# Patient Record
Sex: FEMALE | Race: AMERICAN INDIAN OR ALASKA NATIVE
[De-identification: names, ages, dates, MRNs, and addresses within clinical notes are randomized per-mention and may not be internally consistent; named-entity substitution may affect disease eponyms.]

---

## 2018-02-10 NOTE — EMERGENCY DEPARTMENT REPORT
ED General Adult HPI





- General


Chief complaint: Chest Pain


Stated complaint: ASTHMA/CP


Time Seen by Provider: 02/10/18 15:16


Source: patient


Mode of arrival: Ambulatory


Limitations: No Limitations





- History of Present Illness


Initial comments: 





Patient from out of town this visiting, states forgot her inhalers she states 

she is having chest pain with shortness of breath with question of leg pain 

with question of sinus infection with cough, "sinus infection is going into my 

lungs"


-: Gradual, days(s)


Severity scale (0 -10): 0


Associated Symptoms: chest pain, cough, fever/chills, malaise, shortness of 

breath.  denies: confusion, diaphoresis, headaches, rash, seizure, syncope, 

weakness





- Related Data


 Allergies











Allergy/AdvReac Type Severity Reaction Status Date / Time


 


aspirin Allergy  Hives Verified 02/10/18 12:05


 


codeine Allergy  Hives Verified 02/10/18 12:05


 


Penicillins Allergy  Hives Verified 02/10/18 12:05














ED Review of Systems


ROS: 


Stated complaint: ASTHMA/CP


Other details as noted in HPI





Comment: All other systems reviewed and negative


Constitutional: malaise


ENT: denies: dental pain


Respiratory: cough, shortness of breath, wheezing.  denies: stridor


Cardiovascular: chest pain, other (no exertional chest pain, no tearing pain)


Neurological: denies: headache, weakness, numbness, paresthesias, abnormal gait

, vertigo





ED Past Medical Hx





- Past Medical History


Previous Medical History?: Yes


Hx Hypertension: Yes


Hx Heart Attack/AMI: Yes


Hx Diabetes: Yes (type 2)


Hx Asthma: Yes


Additional medical history: stent placements





- Surgical History


Past Surgical History?: Yes


Additional Surgical History: back, knees





- Social History


Smoking Status: Never Smoker


Substance Use Type: None





ED Physical Exam





- General


Limitations: No Limitations


General appearance: alert, anxious





- Head


Head exam: Present: atraumatic, normocephalic





- Eye


Eye exam: Present: normal appearance, PERRL, EOMI





- ENT


ENT exam: Present: normal exam, normal orophraynx, mucous membranes moist





- Neck


Neck exam: Present: normal inspection.  Absent: tenderness, meningismus





- Respiratory


Respiratory exam: Present: wheezes, rhonchi.  Absent: stridor, chest wall 

tenderness, accessory muscle use





- Cardiovascular


Cardiovascular Exam: Present: regular rate, normal rhythm, normal heart sounds, 

other (pulses equal bilaterally)





- GI/Abdominal


GI/Abdominal exam: Present: soft.  Absent: distended, tenderness, guarding, 

rebound, rigid, mass, pulsatile mass





- Extremities Exam


Extremities exam: Present: normal inspection, normal capillary refill.  Absent: 

tenderness, pedal edema, joint swelling, calf tenderness





- Neurological Exam


Neurological exam: Present: alert, oriented X3, CN II-XII intact.  Absent: 

motor sensory deficit





ED Course


 Vital Signs











  02/10/18 02/10/18 02/10/18





  12:06 14:59 15:50


 


Temperature 98 F  


 


Pulse Rate 62 88 


 


Pulse Rate [   86





Anterior   





Bilateral   





Throughout]   


 


Respiratory 16 20 





Rate   


 


Respiratory   18





Rate [Anterior   





Bilateral   





Throughout]   


 


Blood Pressure  157/89 





[Left]   


 


O2 Sat by Pulse 99 95 





Oximetry   














  02/10/18 02/10/18 02/10/18





  16:18 16:37 17:45


 


Temperature   


 


Pulse Rate   99 H


 


Pulse Rate [ 95 H  





Anterior   





Bilateral   





Throughout]   


 


Respiratory  20 20





Rate   


 


Respiratory 16  





Rate [Anterior   





Bilateral   





Throughout]   


 


Blood Pressure   139/84





[Left]   


 


O2 Sat by Pulse  100 99





Oximetry   














- Reevaluation(s)


Reevaluation #1: 





02/10/18 19:27


Given the nature of the symptoms with shortness of breath and chest pain we did 

elect to get a VQ scan this was read as low prob.  Chest x-ray was read as no 

acute process per the radiologist





ED Medical Decision Making





- Lab Data


Result diagrams: 


 02/10/18 12:26





 02/10/18 12:26





- EKG Data


EKG shows normal: sinus rhythm





- EKG Data





02/10/18 19:28


no ischemic change normal sinus rhythm





- Radiology Data


Radiology results: report reviewed





- Medical Decision Making





Laboratory studies did show abnormal rf therefore v/q was obtained .negative 

troponin and symptoms are not consistent with ACS.  Patient doeshave symptoms 

consistent with acute exacerbation of chronic lung disease with acute 

bronchitis.w/ hx of asbestos exposure, did not get flu shot,  She had low 

problem PE study she is not improved in the ED with improved vs but still 

dypsneic in ed and uncomfortable at rest w/ desats w/ activity, .she was 

therefore admitted for copd/ restrictive lung dz exacerbation, , after 

consultation w/ dr dominguez who will eval pt in ed.


Critical care attestation.: 


If time is entered above; I have spent that time in minutes in the direct care 

of this critically ill patient, excluding procedure time.








ED Disposition


Clinical Impression: 


 Decompensated chronic obstructive pulmonary disease with exacerbation





Disposition: DC-09 OP ADMIT IP TO THIS HOSP


Is pt being admited?: Yes


Condition: Stable


Instructions:  Chronic Obstructive Pulmonary Disease (ED)


Time of Disposition: 19:40

## 2018-02-10 NOTE — HISTORY AND PHYSICAL REPORT
History of Present Illness


Date of examination: 02/10/18


Date of admission: 





2/10/18


Chief complaint: 





Increasing SOB/wheezing for 2 days


History of present illness: 


History of Present Illness


66 y/o AAF with Pmh of T2DM CAD HTn and asthma presents with increasing SOB and 

wheezing.Cough productive of mucoid sputum


No fever or chillsNo recent travel





Past Medical History


Previous Medical History?: Yes


Hx Hypertension: Yes


Hx Heart Attack/AMI: Yes


Hx Diabetes: Yes (type 2)


Hx Asthma: Yes


Additional medical history: stent placements





- Surgical History


Past Surgical History?: Yes


Additional Surgical History: back, knees





- Social History


Smoking Status: Never Smoker


Substance Use Type: None








 Review of Systems


ROS: 


Stated complaint: ASTHMA/CP


Other details as noted in HPI





Comment: All other systems reviewed and negative


Constitutional: malaise


ENT: denies: dental pain


Respiratory: cough, shortness of breath, wheezing.  denies: stridor


Cardiovascular: chest pain, other (no exertional chest pain, no tearing pain)


Neurological: denies: headache, weakness, numbness, paresthesias, abnormal gait

, vertigo

















Medications and Allergies


 Allergies











Allergy/AdvReac Type Severity Reaction Status Date / Time


 


aspirin Allergy  Hives Verified 02/10/18 12:05


 


codeine Allergy  Hives Verified 02/10/18 12:05


 


Penicillins Allergy  Hives Verified 02/10/18 12:05











 Home Medications











 Medication  Instructions  Recorded  Confirmed  Last Taken  Type


 


ALPRAZolam [Xanax] 25 mg PO DAILY 02/10/18 02/10/18 Unknown History


 


Amlodipine Besylate [Norvasc] 5 mg PO DAILY 02/10/18 02/10/18 Unknown History


 


Canagliflozin [Invokana] 300 mg PO DAILY 02/10/18 02/10/18 Unknown History


 


Clopidogrel Bisulfate [Plavix] 75 mg PO DAILY 02/10/18 02/10/18 Unknown History


 


Cyclobenzaprine [Flexeril 10 MG 10 mg PO DAILY PRN 02/10/18 02/10/18 Unknown 

History





TAB]     


 


Diclofenac Sodium 75 mg PO DAILY PRN 02/10/18 02/10/18 Unknown History


 


Donepezil [Aricept] 5 mg PO DAILY 02/10/18 02/10/18 Unknown History


 


Linaclotide [Linzess] 145 mcg PO QDAY 02/10/18 02/10/18 Unknown History


 


Metoprolol Jay/Hydrochlorothiaz 100 mg PO DAILY 02/10/18 02/10/18 Unknown History





[Metoprolol ER-Hctz 100-12.5 mg]     


 


Montelukast [Singulair] 10 mg PO QPM 02/10/18 02/10/18 Unknown History


 


Ondansetron [Zofran Odt] 4 mg PO DAILY PRN 02/10/18 02/10/18 Unknown History


 


Pantoprazole [Protonix] 40 mg PO QDAY 02/10/18 02/10/18 Unknown History


 


Ranolazine [Ranexa] 1,000 mg PO BID 02/10/18 02/10/18 Unknown History


 


Rosuvastatin Calcium [Crestor] 40 mg PO DAILY 02/10/18 02/10/18 Unknown History


 


Sertraline [Zoloft] 100 mg PO HS 02/10/18 02/10/18 Unknown History


 


Spironolactone 25 mg PO DAILY 02/10/18 02/10/18 Unknown History


 


Sucralfate [Carafate] 1 gm PO ACHS 02/10/18 02/10/18 Unknown History


 


metFORMIN [Glucophage] 500 mg PO QDAY 02/10/18 02/10/18 Unknown History














Exam





- Constitutional


Vitals: 


 











Temp Pulse Resp BP Pulse Ox


 


 98.2 F   94 H  18   137/85   100 


 


 02/10/18 19:15  02/10/18 20:35  02/10/18 20:35  02/10/18 19:15  02/10/18 19:15














Results





- Labs


CBC & Chem 7: 


 02/11/18 03:13





 02/11/18 03:13


Labs: 


 Laboratory Last Values











WBC  5.0 K/mm3 (4.5-11.0)   02/10/18  12:26    


 


RBC  4.45 M/mm3 (3.65-5.03)   02/10/18  12:26    


 


Hgb  13.3 gm/dl (10.1-14.3)   02/10/18  12:26    


 


Hct  40.2 % (30.3-42.9)   02/10/18  12:26    


 


MCV  90 fl (79-97)   02/10/18  12:26    


 


MCH  30 pg (28-32)   02/10/18  12:26    


 


MCHC  33 % (30-34)   02/10/18  12:26    


 


RDW  14.1 % (13.2-15.2)   02/10/18  12:26    


 


Plt Count  150 K/mm3 (140-440)   02/10/18  12:26    


 


Lymph % (Auto)  19.0 % (13.4-35.0)   02/10/18  12:26    


 


Mono % (Auto)  9.5 % (0.0-7.3)  H  02/10/18  12:26    


 


Eos % (Auto)  0.4 % (0.0-4.3)   02/10/18  12:26    


 


Baso % (Auto)  0.3 % (0.0-1.8)   02/10/18  12:26    


 


Lymph #  0.9 K/mm3 (1.2-5.4)  L  02/10/18  12:26    


 


Mono #  0.5 K/mm3 (0.0-0.8)   02/10/18  12:26    


 


Eos #  0.0 K/mm3 (0.0-0.4)   02/10/18  12:26    


 


Baso #  0.0 K/mm3 (0.0-0.1)   02/10/18  12:26    


 


Seg Neutrophils %  70.8 % (40.0-70.0)  H  02/10/18  12:26    


 


Seg Neutrophils #  3.5 K/mm3 (1.8-7.7)   02/10/18  12:26    


 


D-Dimer  354.19 ng/mlDDU (0-234)  H  02/10/18  15:46    


 


Sodium  136 mmol/L (137-145)  L  02/10/18  12:26    


 


Potassium  4.0 mmol/L (3.6-5.0)   02/10/18  12:26    


 


Chloride  100.4 mmol/L ()   02/10/18  12:26    


 


Carbon Dioxide  19 mmol/L (22-30)  L  02/10/18  12:26    


 


Anion Gap  21 mmol/L  02/10/18  12:26    


 


BUN  25 mg/dL (7-17)  H  02/10/18  12:26    


 


Creatinine  1.6 mg/dL (0.7-1.2)  H  02/10/18  12:26    


 


Estimated GFR  32 ml/min  02/10/18  12:26    


 


BUN/Creatinine Ratio  16 %  02/10/18  12:26    


 


Glucose  108 mg/dL ()  H  02/10/18  12:26    


 


Calcium  9.2 mg/dL (8.4-10.2)   02/10/18  12:26    


 


Troponin T  < 0.010 ng/mL (0.00-0.029)   02/10/18  17:29    














- Imaging and Cardiology


EKG: report reviewed


Chest x-ray: report reviewed





Assessment and Plan


Advance Directives: Yes (Full code)


VTE prophylaxis?: Chemical


Plan of care discussed with patient/family: Yes





- Patient Problems


(1) Acute respiratory failure with hypoxia


Current Visit: Yes   Status: Acute   


Plan to address problem: 


Initial sats were 89 percent 


Improved








(2) Asthma with exacerbation


Current Visit: Yes   Status: Acute   


Qualifiers: 


   Asthma severity: severe 


Plan to address problem: 


COnt Bronchodilators Solumedrol IV and IV abx








(3) HTN (hypertension)


Current Visit: Yes   Status: Chronic   


Qualifiers: 


   Hypertension type: essential hypertension   Qualified Code(s): I10 - 

Essential (primary) hypertension   


Plan to address problem: 


Cont Antihypertensives








(4) T2DM (type 2 diabetes mellitus)


Current Visit: Yes   Status: Chronic   


Qualifiers: 


   Diabetes mellitus complication status: without complication 


Plan to address problem: 


Cont coverage and oral Hypoglycemics


Check A1c








(5) HLD (hyperlipidemia)


Current Visit: Yes   Status: Chronic   


Qualifiers: 


   Hyperlipidemia type: mixed hyperlipidemia   Qualified Code(s): E78.2 - Mixed 

hyperlipidemia   


Plan to address problem: 


Cont statins








(6) DVT prophylaxis


Current Visit: Yes   Status: Acute   


Plan to address problem: 


on Lovenox

## 2018-02-10 NOTE — NUCLEAR MEDICINE REPORT
FINAL REPORT



EXAM:  NM LUNG SCAN PERF/VENT



HISTORY:  elev ddimer/low gfr 



COMPARISON:  Chest x-ray from the same date. 



TECHNIQUE:  5 millicuries technetium 99 MAA and 15 millicurie

xenon 133 given for the perfusion and ventilation portions of the

exam. 



FINDINGS:  

Relatively homogeneous uptake of tracer in both the ventilation

and perfusion portions of the exam. No mismatched defect. 



IMPRESSION:  

Very low probability exam for pulmonary embolus.

## 2018-02-10 NOTE — XRAY REPORT
CHEST TWO VIEWS: 02/10/18 11:48:00



CLINICAL: Chest pain.



COMPARISON: None



FINDINGS: Normal heart and pulmonary vasculature.Large calcified 

bilateral hilar lymph nodes and large calcified mediastinal lymph 

nodes.  Small bilateral upper lobe calcified granulomata.  No airspace 

disease or pleural effusion.The bones and soft tissues are unremarkable.



IMPRESSION: No acute cardiopulmonary process.  Old granulomatous 

disease with calcified mediastinal and bilateral hilar lymphadenopathy.

## 2018-02-11 NOTE — PROGRESS NOTE
Assessment and Plan





/Acute respiratory failure with hypoxia


Initial sats were 89 percent 


Improved with current mx








/ Asthma with exacerbation


Cont Bronchodilators Solumedrol IV and IV abx








/HTN (hypertension)


Cont Antihypertensives








/T2DM (type 2 diabetes mellitus)


Cont coverage with SSI 


Hold oral Hypoglycemics metformin for declining renal function, con be restart 

when renal function normalizes


A1c 5.5





/KATY, vasomotor nephropathy


- cont mild hydration





/HLD (hyperlipidemia)


Cont statins








/DVT prophylaxis


on Lovenox, renaly dosed











Subjective


Date of service: 02/11/18


Interval history: 





Pt seen and examined


c/o SOB with exertion, tolerating diet











Objective





- Constitutional


Vitals: 


 Vital Signs - 12hr











  02/10/18 02/10/18 02/10/18





  23:00 23:12 23:20


 


Temperature   


 


Pulse Rate 97 H 95 H 96 H


 


Pulse Rate [   





Anterior   





Bilateral   





Throughout]   


 


Respiratory 19 19 20





Rate   


 


Respiratory   





Rate [Anterior   





Bilateral   





Throughout]   


 


Blood Pressure 140/79 140/79 140/79


 


Blood Pressure   





[Left]   


 


O2 Sat by Pulse 99 100 100





Oximetry   














  02/10/18 02/10/18 02/11/18





  23:30 23:40 00:07


 


Temperature   99.9 F H


 


Pulse Rate 95 H 94 H 


 


Pulse Rate [   





Anterior   





Bilateral   





Throughout]   


 


Respiratory 21 19 18





Rate   


 


Respiratory   





Rate [Anterior   





Bilateral   





Throughout]   


 


Blood Pressure 144/82 140/79 147/93


 


Blood Pressure   





[Left]   


 


O2 Sat by Pulse 100 100 





Oximetry   














  02/11/18 02/11/18 02/11/18





  00:16 02:27 02:41


 


Temperature   


 


Pulse Rate   


 


Pulse Rate [  89 





Anterior   





Bilateral   





Throughout]   


 


Respiratory 19  





Rate   


 


Respiratory  18 18





Rate [Anterior   





Bilateral   





Throughout]   


 


Blood Pressure   


 


Blood Pressure   





[Left]   


 


O2 Sat by Pulse 96  





Oximetry   














  02/11/18 02/11/18 02/11/18





  05:39 08:00 10:03


 


Temperature 99.2 F 98.8 F 


 


Pulse Rate 97 H 91 H 


 


Pulse Rate [   97 H





Anterior   





Bilateral   





Throughout]   


 


Respiratory 18 18 





Rate   


 


Respiratory   20





Rate [Anterior   





Bilateral   





Throughout]   


 


Blood Pressure 145/73  


 


Blood Pressure  158/92 





[Left]   


 


O2 Sat by Pulse 98  





Oximetry   














  02/11/18 02/11/18 02/11/18





  10:14 10:19 10:20


 


Temperature   


 


Pulse Rate  91 H 91 H


 


Pulse Rate [ 90  





Anterior   





Bilateral   





Throughout]   


 


Respiratory   





Rate   


 


Respiratory 20  





Rate [Anterior   





Bilateral   





Throughout]   


 


Blood Pressure  158/92 158/92


 


Blood Pressure   





[Left]   


 


O2 Sat by Pulse   





Oximetry   











General appearance: Present: no acute distress, well-nourished





- EENT


Eyes: PERRL, EOM intact


ENT: hearing intact, clear oral mucosa


Ears: bilateral: normal





- Neck


Neck: supple, normal ROM





- Respiratory


Respiratory effort: normal


Respiratory: bilateral: wheezing





- Cardiovascular


Rhythm: regular


Heart Sounds: Present: S1 & S2.  Absent: gallop, rub


Extremities: pulses intact, No edema, normal color, Full ROM





- Gastrointestinal


General gastrointestinal: Present: soft, non-tender, non-distended, normal 

bowel sounds





- Integumentary


Integumentary: clear, warm, dry





- Musculoskeletal


Musculoskeletal: 1, strength equal bilaterally





- Neurologic


Neurologic: moves all extremities





- Psychiatric


Psychiatric: memory intact, appropriate mood/affect, intact judgment & insight





- Labs


CBC & Chem 7: 


 02/11/18 03:13





 02/11/18 03:13


Labs: 


 Abnormal lab results











  02/10/18 02/10/18 02/10/18 Range/Units





  12:26 12:26 15:46 


 


Plt Count     (140-440)  K/mm3


 


Mono % (Auto)  9.5 H    (0.0-7.3)  %


 


Lymph #  0.9 L    (1.2-5.4)  K/mm3


 


Seg Neutrophils %  70.8 H    (40.0-70.0)  %


 


Seg Neuts % (Manual)     (40.0-70.0)  %


 


Lymphocytes % (Manual)     (13.4-35.0)  %


 


Lymphocytes # (Manual)     (1.2-5.4)  K/mm3


 


D-Dimer    354.19 H  (0-234)  ng/mlDDU


 


Sodium   136 L   (137-145)  mmol/L


 


Chloride     ()  mmol/L


 


Carbon Dioxide   19 L   (22-30)  mmol/L


 


BUN   25 H   (7-17)  mg/dL


 


Creatinine   1.6 H   (0.7-1.2)  mg/dL


 


Glucose   108 H   ()  mg/dL


 


POC Glucose     ()  


 


Alkaline Phosphatase     ()  units/L














  02/11/18 02/11/18 02/11/18 Range/Units





  03:13 03:13 08:15 


 


Plt Count  135 L    (140-440)  K/mm3


 


Mono % (Auto)     (0.0-7.3)  %


 


Lymph #     (1.2-5.4)  K/mm3


 


Seg Neutrophils %     (40.0-70.0)  %


 


Seg Neuts % (Manual)  32.0 L    (40.0-70.0)  %


 


Lymphocytes % (Manual)  7.0 L    (13.4-35.0)  %


 


Lymphocytes # (Manual)  0.5 L    (1.2-5.4)  K/mm3


 


D-Dimer     (0-234)  ng/mlDDU


 


Sodium   133 L   (137-145)  mmol/L


 


Chloride   95.5 L   ()  mmol/L


 


Carbon Dioxide   17 L   (22-30)  mmol/L


 


BUN   18 H   (7-17)  mg/dL


 


Creatinine   1.3 H   (0.7-1.2)  mg/dL


 


Glucose   183 H   ()  mg/dL


 


POC Glucose    138 H  ()  


 


Alkaline Phosphatase   162 H   ()  units/L














- Imaging and cardiology


Chest x-ray: report reviewed (no infiltrates)


Other: other (VQ scan negative for PE)

## 2018-02-12 NOTE — DISCHARGE SUMMARY
Providers





- Providers


Date of Admission: 


02/10/18 21:26





Date of discharge: 02/12/18


Attending physician: 


JASON EMERSON





Primary care physician: 


PRIMARY CARE MD








Hospitalization


Condition: Stable


Hospital course: 





Discharge diagnosis:





/Acute respiratory failure with hypoxia


Initial sats were 89 percent 


Improved with current mx








/ Asthma with exacerbation


Cont Bronchodilators Solumedrol IV and IV abx








/HTN (hypertension)


Cont Antihypertensives








/T2DM (type 2 diabetes mellitus)


Cont coverage with SSI 


Hold oral Hypoglycemics metformin for declining renal function, con be restart 

when renal function normalizes


A1c 5.5





/KATY, vasomotor nephropathy


- cont mild hydration





/HLD (hyperlipidemia)


Cont statins








/DVT prophylaxis


on Lovenox, renaly dosed





Radiological study:


CXR - no acute infiltrates


VQ scan - low probability for PE








Disposition: DC-01 TO HOME OR SELFCARE


Time spent for discharge: 32 minutes





Exam





- Physical Exam


Narrative exam: 








General appearance: Present: no acute distress, well-nourished





- EENT


Eyes: PERRL, EOM intact


ENT: hearing intact, clear oral mucosa


Ears: bilateral: normal





- Neck


Neck: supple, normal ROM





- Respiratory


Respiratory effort: normal


Respiratory: bilateral: very few wheezing





- Cardiovascular


Rhythm: regular


Heart Sounds: Present: S1 & S2.  Absent: gallop, rub


Extremities: pulses intact, No edema, normal color, Full ROM





- Gastrointestinal


General gastrointestinal: Present: soft, non-tender, non-distended, normal 

bowel sounds





- Integumentary


Integumentary: clear, warm, dry





- Musculoskeletal


Musculoskeletal: 1, strength equal bilaterally





- Neurologic


Neurologic: moves all extremities





- Psychiatric


Psychiatric: memory intact, appropriate mood/affect, intact judgment & insight











- Constitutional


Vitals: 


 











Temp Pulse Resp BP Pulse Ox


 


 98.4 F   76   20   124/76   94 


 


 02/12/18 07:35  02/12/18 09:37  02/12/18 07:46  02/12/18 09:37  02/12/18 07:35














Plan


Activity: advance as tolerated


Weight Bearing Status: Weight Bear as Tolerated


Diet: low fat, low salt


Follow up with: 


PRIMARY CARE,MD [Primary Care Provider] - 3-5 Days


Prescriptions: 


ALBUTEROL Inhaler [Proair] 2 puff IH QID PRN 30 Days  inhalation


 PRN Reason: Shortness Of Breath


Azithromycin [Zithromax TAB] 500 mg PO QDAY #5 tablet


Montelukast [Singulair] 10 mg PO QPM #30 tablet


predniSONE [Deltasone] 50 mg PO QDAY #5 tab

## 2019-09-03 NOTE — CAT SCAN REPORT
CT CERVICAL SPINE WITHOUT CONTRAST  

 

HISTORY: Injury; neck pain

 

COMPARISON: None 



TECHNIQUE: CT images of the cervical spine were reconstructed. All CT scans at this location are perf
ormed using CT dose reduction for ALARA by means of automated exposure control. All CT scans at this 
location are performed using CT dose reduction for ALARA by means of automated exposure control.



CONTRAST: None.

 

FINDINGS:

Alignment: Normal.

Vertebrae:I do not see vertebral compression fracture. In the transverse images, I do not see fractur
e involving the bony canal.

Disc Spaces: C2-C3 disc space is normal.



Disc space is narrowed at C3-C4 level minimal retrolisthesis seen. Air bubbles from the degenerative 
disc is seen in the left neuroforamen.



Disc space is narrowed at C4-C5 level. Bony spur is seen in the right lateral recess area. Neuroforam
claude are normal.



C5-C6 disc space is normal.



At C6-C7 level, bony spur is seen extending laterally bilaterally. Neuroforamina are narrowed.



C7-T1 disc space is normal.





Facet Joints:No significant abnormality.

Craniocervical Junction:No significant abnormality.

Prevertebral Soft Tissues:No significant abnormality.

Lung Apices: No significant abnormality.



Additional Findings: None

 

IMPRESSION:

 

I do not see fracture in the cervical spine



Spondylotic changes in the mid cervical level



Signer Name: Rika Machuca MD 

Signed: 9/3/2019 8:57 PM

 Workstation Name: [id number]

## 2019-09-03 NOTE — XRAY REPORT
LEFT RIBS 2 VIEWS



INDICATION / CLINICAL INFORMATION:

left side rib pain.



COMPARISON: 

Chest radiograph 2/10/2018



FINDINGS:



RIBS: No acute, displaced fracture or other acute abnormality.



LUNGS: Visualized lung parenchyma is clear except for a couple of tiny calcified granulomas at the le
ft lung base. No pneumothorax. Numerous calcified mediastinal and hilar lymph nodes are present.



Lumbar posterior instrumented fusion is incompletely imaged.



Signer Name: Zhang Batres MD 

Signed: 9/3/2019 10:59 PM

 Workstation Name: [id number]

## 2019-09-03 NOTE — EMERGENCY DEPARTMENT REPORT
ED General Adult HPI





- General


Chief complaint: Head Injury


Stated complaint: FELL/LT EYE PAIN SWELLING/PAIN


Time Seen by Provider: 09/03/19 15:39


Source: patient


Mode of arrival: Ambulatory


Limitations: No Limitations





- History of Present Illness


Initial comments: 





She presents to the emergency department with the chief complaint of fall.  

Patient states that she was walking up the steps at her daughter's house when 

she fell striking her head first.  Patient denies loss of consciousness.  

Patient is on Plavix but not on any anticoagulants.  Patient complains of a 

headache, left-sided rib pain, facial pain.


-: Sudden


Location: head, neck


Severity scale (0 -10): 5


Quality: stabbing


Consistency: constant


Improves with: none


Worsens with: none


Associated Symptoms: denies other symptoms


Treatments Prior to Arrival: none





- Related Data


                                Home Medications











 Medication  Instructions  Recorded  Confirmed  Last Taken


 


ALPRAZolam [Xanax] 1 mg PO DAILY 02/10/18 02/11/18 Unknown


 


Amlodipine Besylate [Norvasc] 5 mg PO DAILY 02/10/18 02/10/18 Unknown


 


Canagliflozin [Invokana] 300 mg PO DAILY 02/10/18 02/10/18 Unknown


 


Clopidogrel Bisulfate [Plavix] 75 mg PO DAILY 02/10/18 02/10/18 Unknown


 


Cyclobenzaprine [Flexeril 10 MG 10 mg PO DAILY PRN 02/10/18 02/10/18 Unknown





TAB]    


 


Diclofenac Sodium 75 mg PO DAILY PRN 02/10/18 02/10/18 Unknown


 


Donepezil [Aricept] 5 mg PO DAILY 02/10/18 02/10/18 Unknown


 


Linaclotide [Linzess] 145 mcg PO QDAY 02/10/18 02/10/18 Unknown


 


Metoprolol Jay/Hydrochlorothiaz 100 mg PO DAILY 02/10/18 02/10/18 Unknown





[Metoprolol ER-Hctz 100-12.5 mg]    


 


Ondansetron [Zofran Odt] 4 mg PO DAILY PRN 02/10/18 02/10/18 Unknown


 


Pantoprazole [Protonix TAB] 40 mg PO QDAY 02/10/18 02/10/18 Unknown


 


Ranolazine [Ranexa] 1,000 mg PO BID 02/10/18 02/10/18 Unknown


 


Rosuvastatin Calcium [Crestor] 40 mg PO DAILY 02/10/18 02/10/18 Unknown


 


Sertraline [Zoloft] 100 mg PO HS 02/10/18 02/10/18 Unknown


 


Sucralfate [Carafate] 1 gm PO ACHS 02/10/18 02/10/18 Unknown


 


metFORMIN [Glucophage] 500 mg PO QDAY 02/10/18 02/10/18 Unknown








                                  Previous Rx's











 Medication  Instructions  Recorded  Last Taken  Type


 


ALBUTEROL Inhaler (OR & NICU) 2 puff IH QID PRN 30 Days 02/12/18 Unknown Rx





[Proair] inhalation   


 


Azithromycin [Zithromax TAB] 500 mg PO QDAY #5 tablet 02/12/18 Unknown Rx


 


Montelukast [Singulair] 10 mg PO QPM #30 tablet 02/12/18 Unknown Rx


 


predniSONE [Deltasone] 50 mg PO QDAY #5 tab 02/12/18 Unknown Rx


 


Oxycodone HCl/Acetaminophen 1 each PO Q12HR PRN #15 tablet 09/03/19 Unknown Rx





[Percocet 10/325 mg]    











                                    Allergies











Allergy/AdvReac Type Severity Reaction Status Date / Time


 


aspirin Allergy  Hives Verified 02/10/18 12:05


 


codeine Allergy  Hives Verified 02/10/18 12:05


 


Penicillins Allergy  Hives Verified 02/10/18 12:05














ED Review of Systems


ROS: 


Stated complaint: FELL/LT EYE PAIN SWELLING/PAIN


Other details as noted in HPI





Comment: All other systems reviewed and negative


Constitutional: denies: chills, fever


Eyes: denies: eye pain, eye discharge, vision change


ENT: denies: ear pain, throat pain


Respiratory: denies: cough, shortness of breath, wheezing


Cardiovascular: denies: chest pain, palpitations


Endocrine: no symptoms reported


Gastrointestinal: denies: abdominal pain, nausea, diarrhea


Genitourinary: denies: urgency, dysuria, discharge


Musculoskeletal: denies: back pain, joint swelling, arthralgia


Skin: denies: rash, lesions


Neurological: headache.  denies: weakness, paresthesias


Psychiatric: denies: anxiety, depression


Hematological/Lymphatic: denies: easy bleeding, easy bruising





ED Past Medical Hx





- Past Medical History


Previous Medical History?: Yes


Hx Hypertension: Yes


Hx Heart Attack/AMI: Yes


Hx Diabetes: Yes (type 2)


Hx Asthma: Yes


Additional medical history: stent placements





- Surgical History


Past Surgical History?: Yes


Additional Surgical History: back, knees





- Social History


Smoking Status: Never Smoker


Substance Use Type: Alcohol





- Medications


Home Medications: 


                                Home Medications











 Medication  Instructions  Recorded  Confirmed  Last Taken  Type


 


ALPRAZolam [Xanax] 1 mg PO DAILY 02/10/18 02/11/18 Unknown History


 


Amlodipine Besylate [Norvasc] 5 mg PO DAILY 02/10/18 02/10/18 Unknown History


 


Canagliflozin [Invokana] 300 mg PO DAILY 02/10/18 02/10/18 Unknown History


 


Clopidogrel Bisulfate [Plavix] 75 mg PO DAILY 02/10/18 02/10/18 Unknown History


 


Cyclobenzaprine [Flexeril 10 MG 10 mg PO DAILY PRN 02/10/18 02/10/18 Unknown 

History





TAB]     


 


Diclofenac Sodium 75 mg PO DAILY PRN 02/10/18 02/10/18 Unknown History


 


Donepezil [Aricept] 5 mg PO DAILY 02/10/18 02/10/18 Unknown History


 


Linaclotide [Linzess] 145 mcg PO QDAY 02/10/18 02/10/18 Unknown History


 


Metoprolol Jay/Hydrochlorothiaz 100 mg PO DAILY 02/10/18 02/10/18 Unknown History





[Metoprolol ER-Hctz 100-12.5 mg]     


 


Ondansetron [Zofran Odt] 4 mg PO DAILY PRN 02/10/18 02/10/18 Unknown History


 


Pantoprazole [Protonix TAB] 40 mg PO QDAY 02/10/18 02/10/18 Unknown History


 


Ranolazine [Ranexa] 1,000 mg PO BID 02/10/18 02/10/18 Unknown History


 


Rosuvastatin Calcium [Crestor] 40 mg PO DAILY 02/10/18 02/10/18 Unknown History


 


Sertraline [Zoloft] 100 mg PO HS 02/10/18 02/10/18 Unknown History


 


Sucralfate [Carafate] 1 gm PO ACHS 02/10/18 02/10/18 Unknown History


 


metFORMIN [Glucophage] 500 mg PO QDAY 02/10/18 02/10/18 Unknown History


 


ALBUTEROL Inhaler (OR & NICU) 2 puff IH QID PRN 30 Days 02/12/18  Unknown Rx





[Proair] inhalation    


 


Azithromycin [Zithromax TAB] 500 mg PO QDAY #5 tablet 02/12/18  Unknown Rx


 


Montelukast [Singulair] 10 mg PO QPM #30 tablet 02/12/18  Unknown Rx


 


predniSONE [Deltasone] 50 mg PO QDAY #5 tab 02/12/18  Unknown Rx


 


Oxycodone HCl/Acetaminophen 1 each PO Q12HR PRN #15 tablet 09/03/19  Unknown Rx





[Percocet 10/325 mg]     














ED Physical Exam





- General


Limitations: No Limitations


General appearance: alert, in no apparent distress





- Head


Head exam: Present: atraumatic, normocephalic





- Eye


Eye exam: Present: normal appearance, PERRL, EOMI, other (patient has 

paravertebral tenderness to palpation of the left side with periorbital 

ecchymosis)





- ENT


ENT exam: Present: mucous membranes moist





- Neck


Neck exam: Present: normal inspection





- Respiratory


Respiratory exam: Present: normal lung sounds bilaterally.  Absent: respiratory 

distress





- Cardiovascular


Cardiovascular Exam: Present: regular rate, normal rhythm.  Absent: systolic 

murmur, diastolic murmur, rubs, gallop





- GI/Abdominal


GI/Abdominal exam: Present: soft, normal bowel sounds.  Absent: distended, 

tenderness





- Extremities Exam


Extremities exam: Present: normal inspection





- Back Exam


Back exam: Present: normal inspection, other (Gen. palpation of ribs 8 through 

10 left-sided)





- Neurological Exam


Neurological exam: Present: alert, oriented X3, CN II-XII intact.  Absent: motor

sensory deficit





- Psychiatric


Psychiatric exam: Present: normal affect, normal mood





- Skin


Skin exam: Present: warm, dry, intact, normal color.  Absent: rash





ED Course


                                   Vital Signs











  09/03/19 09/03/19 09/03/19





  15:36 18:30 18:43


 


Temperature 98.3 F  98 F


 


Pulse Rate 85  80


 


Respiratory 18  18





Rate   


 


Blood Pressure 210/132 194/102 


 


Blood Pressure   194/102





[Right]   


 


O2 Sat by Pulse 98  98





Oximetry   














  09/03/19 09/03/19 09/03/19





  18:46 19:00 19:16


 


Temperature   


 


Pulse Rate 79 80 


 


Respiratory 16 15 





Rate   


 


Blood Pressure 194/102 194/102 


 


Blood Pressure   





[Right]   


 


O2 Sat by Pulse   98





Oximetry   














  09/03/19 09/03/19 09/03/19





  20:00 20:20 20:30


 


Temperature   


 


Pulse Rate 80  


 


Respiratory 12  





Rate   


 


Blood Pressure 216/141 216/141 216/141


 


Blood Pressure   





[Right]   


 


O2 Sat by Pulse  99 100





Oximetry   














  09/03/19 09/03/19 09/03/19





  20:46 21:00 21:16


 


Temperature   


 


Pulse Rate   


 


Respiratory   





Rate   


 


Blood Pressure 197/115 216/141 197/115


 


Blood Pressure   





[Right]   


 


O2 Sat by Pulse 97 98 98





Oximetry   














  09/03/19





  21:30


 


Temperature 


 


Pulse Rate 


 


Respiratory 





Rate 


 


Blood Pressure 221/119


 


Blood Pressure 





[Right] 


 


O2 Sat by Pulse 96





Oximetry 














ED Medical Decision Making





- Lab Data


Result diagrams: 


                                 09/03/19 16:21





                                 09/03/19 16:21








                                   Lab Results











  09/03/19 09/03/19 09/03/19 Range/Units





  16:21 16:21 16:21 


 


WBC  5.2    (4.5-11.0)  K/mm3


 


RBC  4.51    (3.65-5.03)  M/mm3


 


Hgb  13.6    (10.1-14.3)  gm/dl


 


Hct  40.9    (30.3-42.9)  %


 


MCV  91    (79-97)  fl


 


MCH  30    (28-32)  pg


 


MCHC  33    (30-34)  %


 


RDW  13.5    (13.2-15.2)  %


 


Plt Count  132 L    (140-440)  K/mm3


 


Lymph % (Auto)  33.5    (13.4-35.0)  %


 


Mono % (Auto)  7.3    (0.0-7.3)  %


 


Eos % (Auto)  2.1    (0.0-4.3)  %


 


Baso % (Auto)  0.9    (0.0-1.8)  %


 


Lymph #  1.7    (1.2-5.4)  K/mm3


 


Mono #  0.4    (0.0-0.8)  K/mm3


 


Eos #  0.1    (0.0-0.4)  K/mm3


 


Baso #  0.0    (0.0-0.1)  K/mm3


 


Seg Neutrophils %  56.2    (40.0-70.0)  %


 


Seg Neutrophils #  2.9    (1.8-7.7)  K/mm3


 


PT   13.2   (12.2-14.9)  Sec.


 


INR   1.03   (0.87-1.13)  


 


APTT   30.9   (24.2-36.6)  Sec.


 


Sodium    141  (137-145)  mmol/L


 


Potassium    4.0  (3.6-5.0)  mmol/L


 


Chloride    103.0  ()  mmol/L


 


Carbon Dioxide    26  (22-30)  mmol/L


 


Anion Gap    16  mmol/L


 


BUN    25 H  (7-17)  mg/dL


 


Creatinine    1.3 H  (0.7-1.2)  mg/dL


 


Estimated GFR    41  ml/min


 


BUN/Creatinine Ratio    19  %


 


Glucose    81  ()  mg/dL


 


Calcium    10.0  (8.4-10.2)  mg/dL


 


Total Bilirubin    0.30  (0.1-1.2)  mg/dL


 


AST    20  (5-40)  units/L


 


ALT    15  (7-56)  units/L


 


Alkaline Phosphatase    129  ()  units/L


 


Total Protein    7.1  (6.3-8.2)  g/dL


 


Albumin    4.0  (3.9-5)  g/dL


 


Albumin/Globulin Ratio    1.3  %














- Radiology Data


Radiology results: report reviewed





- Medical Decision Making





Discussed results with patient and her family


Critical care attestation.: 


If time is entered above; I have spent that time in minutes in the direct care 

of this critically ill patient, excluding procedure time.








ED Disposition


Clinical Impression: 


 Closed head injury, Ecchymosis of eye, Rib pain on left side





Disposition: DC-01 TO HOME OR SELFCARE


Is pt being admited?: No


Does the pt Need Aspirin: No


Condition: Stable


Instructions:  Black Eye (ED), Minor Head Injury (ED)


Additional Instructions: 


return if worse


Prescriptions: 


Oxycodone HCl/Acetaminophen [Percocet 10/325 mg] 1 each PO Q12HR PRN #15 tablet


 PRN Reason: Pain


Referrals: 


PRIMARY CARE,MD [Primary Care Provider] - 3-5 Days


FROYLAN ASHLEY MD [Staff Physician] - 3-5 Days


LIS SCHULTZ MD [Staff Physician] - 3-5 Days


[facility], [Provider Group] - 3-5 Days


[facility] [Provider Group] - 3-5 Days


Time of Disposition: 23:14

## 2019-09-03 NOTE — CAT SCAN REPORT
CT MAXILLOFACIAL WITHOUT CONTRAST



INDICATION / CLINICAL INFORMATION:

head and facial injury.



TECHNIQUE:

All CT scans at this location are performed using CT dose reduction for ALARA by means of automated e
xposure control. 



COMPARISON:

None available.



FINDINGS:



FACIAL BONES: No fracture or other significant abnormality.

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 Paran
elmo sinuses are largely free from inflammatory mucosal disease.

NASAL CAVITY:No abnormality

ORBITS: No significant abnormality.



VISUALIZED INTRACRANIAL STRUCTURES: No significant abnormality.  



ADDITIONAL FINDINGS: Portions of the thyroid gland are not included on this study. The right lobe of 
the thyroid gland appears enlarged and may contain a mass. This is incompletely evaluated on CT facia
l bones. Correlation with elective thyroid ultrasound is suggested.



IMPRESSION:

1.   No indication of facial fracture.

2. Abnormal appearing thyroid gland is incompletely evaluated on this study. Further evaluation with 
elective thyroid ultrasound is advised.



Signer Name: Griffin Calderon MD 

Signed: 9/3/2019 6:57 PM

 Workstation Name: [id number]

## 2019-09-03 NOTE — CAT SCAN REPORT
CT HEAD WITHOUT CONTRAST



INDICATION / CLINICAL INFORMATION:

head injury.



TECHNIQUE:

All CT scans at this location are performed using CT dose reduction for ALARA by means of automated e
xposure control. 



COMPARISON:

None available.



FINDINGS:

HEMORRHAGE: No evidence of intracranial hemorrhage or extra-axial fluid collection.

EXTRA-AXIAL SPACES: Cortical sulci, sylvian fissures and basilar cisterns have an unremarkable appear
ance given the patient's age of 68 years.

VENTRICULAR SYSTEM: The ventricular system is of normal size and configuration for the patient's age 
of 68 years.

CEREBRAL PARENCHYMA: No areas of abnormal brain parenchymal attenuation are identified. There is no i
ndication of recent infarction. 

MIDLINE SHIFT OR HERNIATION: There is no mass effect.

CEREBELLUM / BRAINSTEM: Brainstem and cerebellum have an unremarkable appearance.

INTRACRANIAL VESSELS: Calcified atherosclerotic plaque is seen along the course of the cavernous segm
ents of both internal carotid arteries. Similar findings are observed at the distal vertebral arterie
s.

ORBITS: visualized portions of the orbits have an unremarkable appearance.

SOFT TISSUES of HEAD: No significant abnormality.

CALVARIUM: Evaluation of bone windows reveals no abnormalities.

PARANASAL SINUSES / MASTOID AIR CELLS: Paranasal sinuses are free from inflammatory mucosal disease. 
Mastoid air cells are normally pneumatized. Incidental note is made of several small osteomata within
 the frontal sinuses bilaterally.





IMPRESSION:

1. No acute intracranial abnormality. Head CT without contrast is within normal limits given the oneil
ent's age of 68 years.



Signer Name: Griffin Calderon MD 

Signed: 9/3/2019 6:54 PM

 Workstation Name: [id number]

## 2019-09-03 NOTE — EVENT NOTE
ED Screening Note


Date of service: 09/03/19


Time: 15:39


ED Screening Note: 


68 y o f presents with right orbital bruising s/p fall 





+ blood thinner





This initial assessment/diagnostic orders/clinical plan/treatment(s) is/are 

subject to change based on patients health status, clinical progression and re-

assessment by fellow clinical providers in the ED. Further treatment and workup 

at subsequent clinical providers discretion. Patient/guardian urged not to elope

from the ED as their condition may be serious if not clinically assessed and 

managed. 





Initial orders include: 


labs, Ct head and face

## 2019-09-12 NOTE — CONSULTATION
History of Present Illness


Consult date: 09/12/19





- Assessment


Assessment Interval: Baseline





- Level of Consciousness


1a. Level of Consciousness: alert/keenly responsive





- LOC Questions


1b. LOC Questions: answers both correctly





- LOC Command


1c. LOC Commands: performs tasks correctly





- Best Gaze


2. Best Gaze: normal





- Visual


3. Visual: no visual loss





- Facial Palsy


4. Facial Palsy: normal symmetrical movement





- Motor Arm


5a. Motor Arm Left: no drift


5b. Motor Arm Right: no drift





- Motor Leg


6a. Motor Leg Left: no drift


6b. Motor Leg Right: no drift





- Limb Ataxia


7. Limb Ataxia: absent





- Sensory


8. Sensory: normal





- Best Language


9. Best Language: severe aphasia





- Dysarthria


10. Dysarthria: normal





- Extinction and Inattention


11. Extinction/Inattention: no abnormality





- Scoring


Total Score: 2


Stroke Severity: Minor Stroke





Assessment and Plan


Date of Service 9/12/2019





[facility]





Comments:


Last time known well: _ 13:25


Door time: _1355


[facility] contacted: _1352


[facility] at bedside: _1359


NIHSS assessment time: _1415


consult end time: _1440


---------------------------------------------------------------------------


Impression: acute speech impairment Consistent with Acute Ischemic Stroke





Does meet Large Vessel Occlusion (LVO) screening criteria (Aphasia, Neglect, 

Gaze deviation/preference, Dense hemiparesis, or Visual field deficits on exam),

therefore advanced imaging (CTA head and neck and CTP brain) is indicated. 





Differential Diagnosis: 


1. Cardioembolic stroke 


2. Small vessel disease/ lacune 


3. Thromboembolic, artery-to-artery mechanism 


4. Hypercoagulable state-related infarct 


5. Transient ischemic attack 


6. Thrombotic mechanism, large artery disease 





----------

--------------------------------------------------------------------------------


-


tPA decision and other recommendations: 





_


Head CT did not show any acute hemorrhage. reviewed report (if available) and 

images





Blood glucose and Blood Pressure within acceptable parameters:


Patient is a tPA candidate.


Verbal Consent to tPA:


I have explained to the patient/family/guardian the nature of the patient's 

condition, the use of tPA fibrinolytic agent, and the benefits to be reasonably 

expected compared with alternative approaches. I have discussed the likelihood 

of major risks or complications of this procedure including (if applicable) but 

not limited to loss of limb function, brain damage, paralysis, hemorrhage, 

infection, complications from transfusion of blood components, drug reactions, 

blood clots and loss of life. I have also indicated that with any procedure 

there is always the possibility of an unexpected complication. I have explained 

the risks which include:


1. Death, Stroke or permanent neurological injury (Paralysis, coma, etc)


2. Worsening of stroke symptoms from swelling or bleeding in the brain


3. Bleeding in other parts of the body


4. Need for blood transfusions to replace blood or clotting factors


5. Allergic reaction to medications


6. Other unexpected complications


All questions were answered and the patient/family/guardian express 

understanding of the treatment plan and consent to the procedure.


Our recommendations are outlined below.


We will be seeing the patient back in follow up as noted.





Please note: at the time of IV tPA bolus administration results of coagulation 

labs may have not been available.


Recommendations:


The recommended dose of t-PA for acute ischemic stroke is 0.9 mg/kg (maximum 90 

mg) infused over 60 minutes with 10% of the total dose administered as an 

initial intravenous bolus over 1 minute.


Weight: _>100  kg


Verbal order time: _ 14:30


Blood Pressure prior to bolus: _159/92


Needle time: _14:39


Total IV tPA dose:  _ 90mg


*Routine post tPA monitoring including neuro checks and blood pressure control 

during/after treatment


*Monitor blood pressure:


*Check blood pressure and NIHSS every 15 minutes for 2 hours, then every 30 

minutes for 6 hours, and finally every hour for 16 hours


**For blood pressure management:


Systolic greater than 180 OR Diastolic greater than 105:


Option 1: Labetalol 10mg IV for 1-2 min


May repeat or double labetalol every 10 min to maximum dose of 300mg, or give 

initial labetalol dose, then start labetalol drip at 2-8 mg/min


Option 2: Nicardipine 5mg/hr IV infusion as initial dose and titrate to desired 

effect by increasing 2.5 mg/hr every 5 min to maximum of 15 mg/hr;


If blood pressure is not controlled by labetalol or nicardipine, consider sodium

nitroprusside.








Based on the results of CTA head and neck and (if needed) CTP brain, will 

determine presence of Large Vessel Occlusion and eligibility for mechanical 

thrombectomy.








*Admission to ICU


*CT Brain 24 hours post tPA


*NPO until swallowing screen performed and passed


*No antiplatelet agents or anticoagulants (including heparin for DVT proph

ylaxis) in first 24 hours


*No masters catheter, nasogastric tube, arterial catheter or central venous 

catheter for 24 hours, unless absolutely necessary


*Telemetry


*2D Echo


*Lipid panel


*High dose statin


*MRI brain without contrast


*PT/OT/Speech evaluations


*Inpatient Neurology Consultation


*Stroke evaluation as per inpatient neurology recommendations


*Discussed with physician/medical staff


Please contact [facility] to reach me if further 

questions/concerns arise.











 

--------------------------------------------------------------------------------


------------


Reason for Stroke Alert and History of Present Illness:





_ Patient is a(n) 69 years old female, with history of CAD (on Plavix), possibly

on warfarin, back surgery 2017, stroke, 


last known well: 


13:25





presents with altered mental status and speech impairment.





Blood Pressure: 160/91


Blood Glucose:101











 

--------------------------------------------------------------------------------


--------------


Review of Systems: 


Constitutional:  Negative except as documented in history of present illness.  


Eye:  Negative except as documented in history of present illness.  


Ear/Nose/Mouth/Throat:  Negative except as documented in history of present 

illness.  


Respiratory:  Negative except as documented in history of present illness.  


Cardiovascular:  Negative except as documented in history of present illness.  


Gastrointestinal:  Negative except as documented in history of present illness. 




Musculoskeletal:  Negative except as documented in history of present illness.  


Neurologic:  Negative except as documented in history of present illness.  





 

--------------------------------------------------------------------------------


-------------------


Examination: 





NIHSS


Details documented in the note


___


2 (moderate aphasia)


 

--------------------------------------------------------------------------------


-----------------------


Medical Decision Making: 


- Extensive number of diagnosis or management options are considered above. 


- Extensive amount of complex data reviewed. 


- High risk of complication and/or morbidity or mortality are associated with 

differential diagnostic considerations above. 


- There may be Uncertain outcome and increased probability of prolonged 

functional impairment or high probability of severe prolonged functional 

impairment associated with some of these differential diagnoses. 


Medical Data Reviewed: 


1.Data reviewed include clinical labs, radiology, Medical Tests; 


2.Tests results discussed w/performing or interpreting physician; 


3.Obtaining/reviewing old medical records; 


4.Obtaining case history from another source; 


5.Independent review of image, tracing or specimen. 








When possible Patient/family were informed the Neurology Consult would happen 

via TeleHealth consult by way of interactive audio and video telecommunications 

and consented to receiving care in this manner.





Case discussed with the Medical staff.





Critical Care notation: 


I was called to see this critical patient emergently. I personally evaluated 

this critical patient for acute stroke evaluation and determining their 

eligibility for IV Alteplase and interventional therapies. I have spent 

approximately __41 minutes with the patient, including time at bedside, time 

discussing the case with other physicians, reviewing plan of care, and time 

independently reviewing the records and scans.

## 2019-09-12 NOTE — CAT SCAN REPORT
CT HEAD WITHOUT CONTRAST



INDICATION / CLINICAL INFORMATION:  MAIN: CODE STROKE CALL [phone].



TECHNIQUE: Axial imaging performed from the skull apex through the skull base without the use of cont
rast.  Sagittal and coronal reformatted images.  All CT scans at this location are performed using CT
 dose reduction for ALARA by means of automated exposure control. 



COMPARISON: None available.



FINDINGS:



CEREBRAL PARENCHYMA: No significant abnormality. No acute territorial infarct. 

HEMORRHAGE: None.

EXTRA-AXIAL SPACES: Normal in size and morphology for the patient's age.

VENTRICULAR SYSTEM: Normal in size and morphology for the patient's age.

MIDLINE SHIFT OR HERNIATION: None.



CEREBELLUM / BRAINSTEM: No significant abnormality.



CALVARIUM: No significant abnormality.

ORBITS: Normal as visualized.

PARANASAL SINUSES / MASTOID AIR CELLS: Normal as visualized.

SOFT TISSUES of HEAD: No significant abnormality.



ADDITIONAL FINDINGS: None.



IMPRESSION:

No acute intracranial abnormality.



These findings were discussed with Dr. Dia in the emergency department at 1415 hours EST.



Signer Name: Levi Briscoe Jr, MD 

Signed: 9/12/2019 2:16 PM

 Workstation Name: [id number]

## 2019-09-12 NOTE — HISTORY AND PHYSICAL REPORT
History of Present Illness


Date of examination: 09/12/19


Date of admission: 


09/12/2019


Chief complaint: 





Slurred speech since 1:30 PM


History of present illness: 


68-year-old -American female with history of hypertension, diabetes and 

CVA in the past without any residual deficits comes in for sudden onset of 

slurred speech since 1:30 PM.  No upper extremity or lower extremity weakness.  

Able to walk.  No loss of consciousness.  No chest pain or syncope.  Patient had

a fall about 10 days ago and has a periorbital hematoma with supraorbital large 

hematoma about 2 cm x 1 cm..  No recent travel.  Patient takes Plavix for 

previous cerebrovascular accident.  Code stroke was initiated and as per 

telemetry neurology patient was given TPA even though there was no weakness in  

any of the 4 extremities.





 Review of Systems 


ROS: 


Stated complaint: STROKE


Other details as noted in HPI





Comment: All other systems reviewed and negative


Constitutional: weakness.  denies: chills, fever


Eyes: denies: eye pain, vision change


ENT: denies: ear pain, throat pain


Respiratory: denies: cough, shortness of breath


Cardiovascular: denies: chest pain, palpitations


Gastrointestinal: denies: abdominal pain, vomiting


Genitourinary: denies: dysuria, discharge


Musculoskeletal: denies: back pain, arthralgia


Skin: denies: rash, lesions


Neurological: other (difficulty with speach).  denies: headache, numbness








Past History


Past Medical History: diabetes (abdomen bowel sounds call Ambien and medical.  

We favor the past 8 to January surgery and surgical history family history is 

social), hypertension, hyperlipidemia, stroke


Past Surgical History: hysterectomy, total knee replacement ( bilateral), Other 

(lower back surgery)


Social history: lives with family, smoking (in the past--smoked for 30 years), 

full code


Family history: hypertension





Medications and Allergies


                                    Allergies











Allergy/AdvReac Type Severity Reaction Status Date / Time


 


aspirin Allergy  Hives Verified 09/12/19 15:13


 


codeine Allergy  Hives Verified 09/12/19 15:13


 


Penicillins Allergy  Hives Verified 09/12/19 15:13











                                Home Medications











 Medication  Instructions  Recorded  Confirmed  Last Taken  Type


 


ALPRAZolam [Xanax] 1 mg PO DAILY 02/10/18 09/12/19 09/11/19 History


 


Amlodipine Besylate [Norvasc] 5 mg PO DAILY 02/10/18 09/12/19 09/11/19 History


 


Canagliflozin [Invokana] 300 mg PO DAILY 02/10/18 09/12/19 09/11/19 History


 


Clopidogrel Bisulfate [Plavix] 75 mg PO DAILY 02/10/18 09/12/19 09/12/19 History


 


Cyclobenzaprine [Flexeril 10 MG 10 mg PO DAILY PRN 02/10/18 09/12/19 09/08/19 

History





TAB]     


 


Diclofenac Sodium 75 mg PO DAILY PRN 02/10/18 09/12/19 09/11/19 History


 


Donepezil [Aricept] 5 mg PO DAILY 02/10/18 09/12/19 09/11/19 History


 


Linaclotide [Linzess] 145 mcg PO QDAY 02/10/18 09/12/19 09/11/19 History


 


Metoprolol Jay/Hydrochlorothiaz 100 mg PO DAILY 02/10/18 09/12/19 09/11/19 

History





[Metoprolol ER-Hctz 100-12.5 mg]     


 


Ondansetron [Zofran Odt] 4 mg PO DAILY PRN 02/10/18 09/12/19 09/11/19 History


 


Pantoprazole [Protonix TAB] 40 mg PO QDAY 02/10/18 09/12/19 09/11/19 History


 


Ranolazine [Ranexa] 1,000 mg PO BID 02/10/18 09/12/19 09/11/19 History


 


Rosuvastatin Calcium [Crestor] 40 mg PO DAILY 02/10/18 09/12/19 09/11/19 History


 


Sertraline [Zoloft] 100 mg PO HS 02/10/18 09/12/19 09/11/19 History


 


Sucralfate [Carafate] 1 gm PO ACHS 02/10/18 09/12/19 09/11/19 History


 


metFORMIN [Glucophage] 500 mg PO QDAY 02/10/18 09/12/19 09/11/19 History


 


ALBUTEROL Inhaler (OR & NICU) 2 puff IH QID PRN 30 Days 02/12/18 09/12/19 09/11/19 Rx





[Proair] inhalation    


 


Montelukast [Singulair] 10 mg PO QPM #30 tablet 02/12/18 09/12/19 09/08/19 Rx


 


Oxycodone HCl/Acetaminophen 1 each PO Q12HR PRN #15 tablet 09/03/19 09/12/19 09/11/19 Rx





[Percocet 10/325 mg]     














Review of Systems


All systems: negative





Exam





- Physical Exam


Narrative exam: 


Lying in bed comfortably








- Constitutional


Vitals: 


                                        











Temp Pulse Resp BP Pulse Ox


 


 98.2 F   72   16   158/88   97 


 


 09/12/19 14:36  09/12/19 18:56  09/12/19 18:56  09/12/19 18:56  09/12/19 18:56











General appearance: Present: no acute distress, well-nourished





- EENT


Eyes: Present: PERRL


ENT: hearing intact, clear oral mucosa, other (slurred speech present even after

TPA.  Large hematoma periorbitally on the left eye)





- Neck


Neck: Present: supple, normal ROM





- Respiratory


Respiratory effort: normal


Respiratory: bilateral: CTA





- Cardiovascular


Heart rate: 75


Rhythm: regular (75)


Heart Sounds: Present: S1 & S2.  Absent: rub, click





- Extremities


Extremities: no ischemia, pulses intact, pulses symmetrical, No edema


Peripheral Pulses: within normal limits





- Abdominal


General gastrointestinal: Present: soft, non-tender, non-distended, normal bowel

sounds


Female genitourinary: Present: normal





- Rectal


Rectal Exam: deferred





- Integumentary


Integumentary: Present: clear, warm, dry





- Musculoskeletal


Musculoskeletal: gait normal, strength equal bilaterally





- Psychiatric


Psychiatric: appropriate mood/affect, intact judgment & insight





- Neurologic


Neurologic: CNII-XII intact, moves all extremities





- Allied Health


Allied health notes reviewed: nursing, case management





Results





- Labs


CBC & Chem 7: 


                                 09/12/19 15:05





                                 09/13/19 09:37


Labs: 


                             Laboratory Last Values











WBC  4.3 K/mm3 (4.5-11.0)  L  09/12/19  15:05    


 


RBC  4.15 M/mm3 (3.65-5.03)   09/12/19  15:05    


 


Hgb  12.6 gm/dl (10.1-14.3)   09/12/19  15:05    


 


Hct  38.1 % (30.3-42.9)   09/12/19  15:05    


 


MCV  92 fl (79-97)   09/12/19  15:05    


 


MCH  30 pg (28-32)   09/12/19  15:05    


 


MCHC  33 % (30-34)   09/12/19  15:05    


 


RDW  14.0 % (13.2-15.2)   09/12/19  15:05    


 


Plt Count  134 K/mm3 (140-440)  L  09/12/19  15:05    


 


Lymph % (Auto)  29.6 % (13.4-35.0)   09/12/19  15:05    


 


Mono % (Auto)  10.7 % (0.0-7.3)  H  09/12/19  15:05    


 


Eos % (Auto)  3.5 % (0.0-4.3)   09/12/19  15:05    


 


Baso % (Auto)  0.5 % (0.0-1.8)   09/12/19  15:05    


 


Lymph #  1.3 K/mm3 (1.2-5.4)   09/12/19  15:05    


 


Mono #  0.5 K/mm3 (0.0-0.8)   09/12/19  15:05    


 


Eos #  0.2 K/mm3 (0.0-0.4)   09/12/19  15:05    


 


Baso #  0.0 K/mm3 (0.0-0.1)   09/12/19  15:05    


 


Seg Neutrophils %  55.7 % (40.0-70.0)   09/12/19  15:05    


 


Seg Neutrophils #  2.4 K/mm3 (1.8-7.7)   09/12/19  15:05    


 


PT  12.8 Sec. (12.2-14.9)   09/12/19  15:05    


 


INR  0.99  (0.87-1.13)   09/12/19  15:05    


 


APTT  25.9 Sec. (24.2-36.6)   09/12/19  15:05    


 


  20.9 Sec. (15.1-19.6)  H  09/12/19  15:05    


 


Sodium  135 mmol/L (137-145)  L  09/12/19  15:05    


 


Potassium  4.7 mmol/L (3.6-5.0)   09/12/19  15:05    


 


Chloride  102.9 mmol/L ()   09/12/19  15:05    


 


Carbon Dioxide  20 mmol/L (22-30)  L  09/12/19  15:05    


 


  17 mmol/L  09/12/19  15:05    


 


BUN  28 mg/dL (7-17)  H  09/12/19  15:05    


 


  1.5 mg/dL (0.7-1.2)  H  09/12/19  15:05    


 


Estimated GFR  34 ml/min  09/12/19  15:05    


 


  19 %  09/12/19  15:05    


 


Glucose  96 mg/dL ()   09/12/19  15:05    


 


POC Glucose  101  ()   09/12/19  14:06    


 


Calcium  9.8 mg/dL (8.4-10.2)   09/12/19  15:05    


 


  0.20 mg/dL (0.1-1.2)   09/12/19  15:05    


 


AST  22 units/L (5-40)   09/12/19  15:05    


 


ALT  14 units/L (7-56)   09/12/19  15:05    


 


  106 units/L ()   09/12/19  15:05    


 


  145 units/L ()  H  09/12/19  15:05    


 


CK-MB (CK-2)  4.8 ng/mL (0.0-4.0)  H  09/12/19  15:05    


 


CK-MB (CK-2) Rel Index  3.3  (0-4)   09/12/19  15:05    


 


  < 0.010 ng/mL (0.00-0.029)   09/12/19  16:44    


 


  7.1 g/dL (6.3-8.2)   09/12/19  15:05    


 


  4.1 g/dL (3.9-5)   09/12/19  15:05    


 


  1.4 %  09/12/19  15:05    


 


  Yellow  (Yellow)   09/12/19  16:28    


 


  Clear  (Clear)   09/12/19  16:28    


 


  5.0  (5.0-7.0)   09/12/19  16:28    


 


Ur Specific Gravity  1.005  (1.003-1.030)   09/12/19  16:28    


 


  <15 mg/dl mg/dL (Negative)   09/12/19  16:28    


 


  >=500 mg/dL (Negative)   09/12/19  16:28    


 


  Neg mg/dL (Negative)   09/12/19  16:28    


 


  Neg  (Negative)   09/12/19  16:28    


 


  Neg  (Negative)   09/12/19  16:28    


 


  Neg  (Negative)   09/12/19  16:28    


 


  < 2.0 mg/dL (<2.0)   09/12/19  16:28    


 


Ur Leukocyte Esterase  Neg  (Negative)   09/12/19  16:28    


 


  < 1.0 /HPF (0.0-6.0)   09/12/19  16:28    


 


  1.0 /HPF (0.0-6.0)   09/12/19  16:28    


 


U Epithel Cells (Auto)  2.0 /HPF (0-13.0)   09/12/19  16:28    


 


  Presumptive negative   09/12/19  16:28    


 


  Presumptive negative   09/12/19  16:28    


 


Ur Barbiturates Screen  Presumptive negative   09/12/19  16:28    


 


Ur Phencyclidine Scrn  Presumptive negative   09/12/19  16:28    


 


Ur Amphetamines Screen  Presumptive negative   09/12/19  16:28    


 


U Benzodiazepines Scrn  Presumptive negative   09/12/19  16:28    


 


  Presumptive negative   09/12/19  16:28    


 


U Marijuana (THC) Screen  Presumptive negative   09/12/19  16:28    


 


  Disclamer   09/12/19  16:28    


 


Plasma/Serum Alcohol  < 0.01 % (0-0.07)   09/12/19  15:05    


 


Blood Type  O POSITIVE   09/12/19  15:05    


 


Antibody Screen  Negative   09/12/19  15:05    








                                    Short CBC











  09/12/19 Range/Units





  15:05 


 


WBC  4.3 L  (4.5-11.0)  K/mm3


 


Hgb  12.6  (10.1-14.3)  gm/dl


 


Hct  38.1  (30.3-42.9)  %


 


Plt Count  134 L  (140-440)  K/mm3








                                       BMP











  09/12/19





  15:05


 


Sodium  135 L


 


Potassium  4.7


 


Chloride  102.9


 


Carbon Dioxide  20 L


 


BUN  28 H


 


Creatinine  1.5 H


 


Glucose  96


 


Calcium  9.8








                                 Cardiac Enzymes











  09/12/19 09/12/19 09/12/19 Range/Units





  15:05 16:44 18:46 


 


Total Creatine Kinase  145 H    ()  units/L


 


CK-MB (CK-2)  4.8 H    (0.0-4.0)  ng/mL


 


Troponin T  < 0.010  < 0.010  < 0.010  (0.00-0.029)  ng/mL








                                 Liver Function











  09/12/19 Range/Units





  15:05 


 


Total Bilirubin  0.20  (0.1-1.2)  mg/dL


 


AST  22  (5-40)  units/L


 


ALT  14  (7-56)  units/L


 


Alkaline Phosphatase  106  ()  units/L


 


Albumin  4.1  (3.9-5)  g/dL








                                      Urine











  09/12/19 Range/Units





  16:28 


 


Urine Color  Yellow  (Yellow)  


 


Urine pH  5.0  (5.0-7.0)  


 


Ur Specific Gravity  1.005  (1.003-1.030)  


 


Urine Protein  <15 mg/dl  (Negative)  mg/dL


 


Urine Glucose (UA)  >=500  (Negative)  mg/dL














- Imaging and Cardiology


EKG: report reviewed (normal sinus rhythm heart rate of 75/m no acute ST-T wave 

changes)


CT Scan - head: report reviewed (no acute findings, no intracranial bleed)


Imaging and Cardiology: 


Head CTA





INTERNAL CAROTID ARTERIES: Atherosclerotic disease seen in the cavernous 

portions of both internal


carotid arteries. There may be hemodynamically significant narrowing on the 

right.


VERTEBROBASILAR SYSTEM: No significant narrowing appreciated.





DISTAL BRANCHES: Distal branches of the anterior, middle, and posterior cerebral

arteries are


fairly symmetric in appearance and number. There is a focal area of narrowing in

the distal A1


segment on the right.





ANEURYSM: None identified.





ADDITIONAL FINDINGS: Arachnoid cyst seen along the intraparietal sulcus on the 

left.





IMPRESSION:


Areas of narrowing identified, as described above.








Neck CTA





IMPRESSION:


1. Focal area of narrowing in the left vertebral artery as described above.


2. Pulmonary nodule in the left upper lobe suggested. Comparison with prior exam

would be helpful,


if available.











Assessment and Plan


Advance Directives: Yes (full code)


VTE prophylaxis?: Chemical


Plan of care discussed with patient/family: Yes





- Patient Problems


(1) Acute CVA (cerebrovascular accident)


Current Visit: Yes   Status: Acute   


Plan to address problem: 


Patient has slurred speech


No residual deficits


Tele neurology recommended TPA


TPA was given in the emergency room


Admit to ICU


MRI and echocardiogram ordered


MRA and carotid duplex scan were not ordered because CT angiogram of the head 

and neck were done


Neurology consult requested


Lovenox from tomorrow midnight


Aspirin to be given


Patient on Plavix








(2) HTN (hypertension)


Current Visit: No   Status: Chronic   


Qualifiers: 


   Hypertension type: essential hypertension   Qualified Code(s): I10 - 

Essential (primary) hypertension   


Plan to address problem: 


Continue antihypertensives








(3) Type 2 diabetes mellitus


Current Visit: Yes   Status: Chronic   


Qualifiers: 


   Diabetes mellitus long term insulin use: unspecified long term insulin use 

status 


Plan to address problem: 


Continue hypoglycemics and coverage with insulin


Check hemoglobin A1c








(4) Closed head injury


Current Visit: No   Status: Acute   


Qualifiers: 


   Encounter type: initial encounter   Qualified Code(s): S09.90XA - Unspecified

injury of head, initial encounter   


Plan to address problem: 


Happen while 10 days ago with periorbital ecchymosis and a large hematoma.  

Patient was aortograph to be last but deferred because of TPA.  Can be done as 

an outpatient later.  Not emergent











(5) Acute kidney injury (KATY) with acute tubular necrosis (ATN)


Current Visit: Yes   Status: Acute   


Plan to address problem: 


IV fluids for now


Monitor creatinine and BUn








(6) Hyponatremia


Current Visit: Yes   Status: Acute   


Plan to address problem: 


Very mild


IV normal saline for 12 hours








(7) DVT prophylaxis


Current Visit: No   Status: Acute   


Plan to address problem: 


On Lovenox from tomorrow and GI prophylaxis

## 2019-09-12 NOTE — EMERGENCY DEPARTMENT REPORT
HPI





- General


Chief Complaint: Neuro Symptoms/Deficit


Time Seen by Provider: 09/12/19 13:58





- HPI


HPI: 





68-year-old  female presents to the emergency department via EMS

from home as a code stroke.  The patient began feeling some generalized weakness

and had trouble with her speech starting around 1:15 PM this afternoon.  She 

denies any headache, vision change, numbness, trouble moving her extremities.  

She apparently has a history of a previous CVA without any residual deficits.  

She says that she takes Plavix for this but otherwise no other blood thinners.  

Patient is a poor historian secondary to her current acute medical condition.  

The patient makes sense with what she is saying but she has some difficulty 

getting it out and it is slightly slurred.





ED Past Medical Hx





- Medications


Home Medications: 


                                Home Medications











 Medication  Instructions  Recorded  Confirmed  Last Taken  Type


 


ALPRAZolam [Xanax] 1 mg PO DAILY 02/10/18 09/12/19 09/11/19 History


 


Amlodipine Besylate [Norvasc] 5 mg PO DAILY 02/10/18 09/12/19 09/11/19 History


 


Canagliflozin [Invokana] 300 mg PO DAILY 02/10/18 09/12/19 09/11/19 History


 


Clopidogrel Bisulfate [Plavix] 75 mg PO DAILY 02/10/18 09/12/19 09/12/19 History


 


Cyclobenzaprine [Flexeril 10 MG 10 mg PO DAILY PRN 02/10/18 09/12/19 09/08/19 

History





TAB]     


 


Diclofenac Sodium 75 mg PO DAILY PRN 02/10/18 09/12/19 09/11/19 History


 


Donepezil [Aricept] 5 mg PO DAILY 02/10/18 09/12/19 09/11/19 History


 


Linaclotide [Linzess] 145 mcg PO QDAY 02/10/18 09/12/19 09/11/19 History


 


Metoprolol Jay/Hydrochlorothiaz 100 mg PO DAILY 02/10/18 09/12/19 09/11/19 

History





[Metoprolol ER-Hctz 100-12.5 mg]     


 


Ondansetron [Zofran Odt] 4 mg PO DAILY PRN 02/10/18 09/12/19 09/11/19 History


 


Pantoprazole [Protonix TAB] 40 mg PO QDAY 02/10/18 09/12/19 09/11/19 History


 


Ranolazine [Ranexa] 1,000 mg PO BID 02/10/18 09/12/19 09/11/19 History


 


Rosuvastatin Calcium [Crestor] 40 mg PO DAILY 02/10/18 09/12/19 09/11/19 History


 


Sertraline [Zoloft] 100 mg PO HS 02/10/18 09/12/19 09/11/19 History


 


Sucralfate [Carafate] 1 gm PO ACHS 02/10/18 09/12/19 09/11/19 History


 


metFORMIN [Glucophage] 500 mg PO QDAY 02/10/18 09/12/19 09/11/19 History


 


ALBUTEROL Inhaler (OR & NICU) 2 puff IH QID PRN 30 Days 02/12/18 09/12/19 09/11/19 Rx





[Proair] inhalation    


 


Montelukast [Singulair] 10 mg PO QPM #30 tablet 02/12/18 09/12/19 09/08/19 Rx


 


Oxycodone HCl/Acetaminophen 1 each PO Q12HR PRN #15 tablet 09/03/19 09/12/19 09/11/19 Rx





[Percocet 10/325 mg]     














ED Review of Systems


ROS: 


Stated complaint: STROKE


Other details as noted in HPI





Comment: All other systems reviewed and negative


Constitutional: weakness.  denies: chills, fever


Eyes: denies: eye pain, vision change


ENT: denies: ear pain, throat pain


Respiratory: denies: cough, shortness of breath


Cardiovascular: denies: chest pain, palpitations


Gastrointestinal: denies: abdominal pain, vomiting


Genitourinary: denies: dysuria, discharge


Musculoskeletal: denies: back pain, arthralgia


Skin: denies: rash, lesions


Neurological: other (difficulty with speach).  denies: headache, numbness





Physical Exam





- Physical Exam


Physical Exam: 





GENERAL: The patient is well-developed well-nourished.


HENT: Normocephalic.  Atraumatic.    Patient has moist mucous membranes.


EYES: Extraocular motions are intact.  Pupils equal reactive to light 

bilaterally.


NECK: Supple. Trachea is midline.


CHEST/LUNGS: Clear to auscultation.  There is no respiratory distress noted.


HEART/CARDIOVASCULAR: Regular.  There is no tachycardia.  There is no murmur.


ABDOMEN: Abdomen is soft, nontender.  Patient has normal bowel sounds.  There is

no abdominal distention.


SKIN: Skin is warm and dry.


NEURO: The patient is awake, alert, and oriented.  The patient is cooperative.  

No motor deficits.  Patient has moderate aphasia.  Subjective decreased 

sensation to the left face, arm and leg.  


MUSCULOSKELETAL: There is no tenderness or deformity.  There is no limitation 

range of motion.  There is no evidence of acute injury.





ED Course





- Consultations


Consultation #1: 


As soon as the code stroke was initiated, the telemedicine neurologist was 

contacted, Dr. Grover.  The neurologist saw the patient in the room and has 

initiated tPA.  The patient will then be admitted to the hospital for further 

evaluation and post-TPA monitoring.


09/12/19 14:47








ED Medical Decision Making





- Lab Data


Result diagrams: 


                                 09/12/19 15:05





                                 09/12/19 15:05





- EKG Data


-: EKG Interpreted by Me


EKG shows normal: sinus rhythm, axis, intervals (mild prolongation of DC 

interval), QRS complexes, ST-T waves


Rate: normal





- EKG Data


When compared to previous EKG there are: previous EKG unavailable


Interpretation: other (sinus rhythm, mild prolongation of DC interval.  No ST 

elevation MI)





- Radiology Data


Radiology results: report reviewed





CT HEAD WITHOUT CONTRAST INDICATION / CLINICAL INFORMATION: MAIN: CODE STROKE 

CALL [phone]. TECHNIQUE: Axial imaging performed from the skull apex 

through the skull base without the use of contrast. Sagittal and coronal 

reformatted images. All CT scans at this location are performed using CT dose 

reduction for ALARA by means of automated exposure control. COMPARISON: None 

available. FINDINGS: CEREBRAL PARENCHYMA: No significant abnormality. No acute 

territorial infarct. HEMORRHAGE: None. EXTRA-AXIAL SPACES: Normal in size and 

morphology for the patient's age. VENTRICULAR SYSTEM: Normal in size and 

morphology for the patient's age. MIDLINE SHIFT OR HERNIATION: None. CEREBELLUM 

/ BRAINSTEM: No significant abnormality. CALVARIUM: No significant abnormality. 

ORBITS: Normal as visualized. PARANASAL SINUSES / MASTOID AIR CELLS: Normal as 

visualized. SOFT TISSUES of HEAD: No significant abnormality. ADDITIONAL 

FINDINGS: None. IMPRESSION: No acute intracranial abnormality. 








CT angio neck INDICATION / CLINICAL INFORMATION: 68 years Female; stroke. 

TECHNIQUE: Thin cut axial images obtained through the head during IV bolus 

contrast administration. Sagittal, coronal, and 3 plane MIP reconstructions 

performed by the technologist. NASCET type criteria used evaluate stenoses. All 

CT scans at this location are performed using CT dose reduction for ALARA by 

means of automated exposure control. COMPARISON: None available. FINDINGS: ARCH:

Bovine arch configuration noted. CAROTID ARTERIES: The visualized common and 

internal carotid arteries are patent. Atherosclerotic disease is seen in the 

carotid bifurcation region on the left, in the prevertebral space at 

approximately the C4 level-this finding not felt to be hemodynamically 

significant. The carotid arteries are tortuous-would question history of 

hypertension. The internal carotid arteries are otherwise widely patent. 

VERTEBRAL ARTERIES: Codominant vertebral system seen. Focal area of narrowing 

seen at the C7 level in the left vertebral artery-mild to moderate in degree. 

Areas of minimal narrowing seen in the right vertebral artery, related to 

atherosclerotic disease. ADDITIONAL FINDINGS: There is a heterogeneous 

appearance in the thyroid lobes, although no dominant nodule is seen. Follow-up 

with ultrasound as clinically warranted. Peripheral, pulmonary nodule is seen in

the anterior segment of the left upper lobe with smaller adjacent nodule 

identified. Comparison with any prior CT of the chest would be helpful, if 

available. This finding measures up to 2.4 cm in maximum dimension. Otherwise, 

postcontrast CT may be of benefit. Scarring type changes seen in both lung 

apices as well. Mild degenerative changes seen along the cervical spine. 

IMPRESSION: 1. Focal area of narrowing in the left vertebral artery as described

above. 2. Pulmonary nodule in the left upper lobe suggested. Comparison with pr

ior exam would be helpful, if available. 








CT angio head INDICATION / CLINICAL INFORMATION: 68 years Female; stroke. 

TECHNIQUE: Thin cut axial images obtained through the head during IV bolus 

contrast administration. Sagittal, coronal, and 3 plane MIP reconstructions 

performed by the technologist. NASCET type criteria used evaluate stenoses. 

Automated exposure control utilized for radiation reduction purposes. 

COMPARISON: None available. FINDINGS: INTERNAL CAROTID ARTERIES: Atherosclerotic

disease seen in the cavernous portions of both internal carotid arteries. There 

may be hemodynamically significant narrowing on the right. VERTEBROBASILAR 

SYSTEM: No significant narrowing appreciated. DISTAL BRANCHES: Distal branches 

of the anterior, middle, and posterior cerebral arteries are fairly symmetric in

appearance and number. There is a focal area of narrowing in the distal A1 

segment on the right. ANEURYSM: None identified. ADDITIONAL FINDINGS: Arachnoid 

cyst seen along the intraparietal sulcus on the left. IMPRESSION: Areas of 

narrowing identified, as described above. 





- Medical Decision Making


This patient presented with an acute onset of some aphasia and slurred speech at

around 1:15 PM in the afternoon.  A code stroke was called and the patient was 

immediately taken to CT scan for a CT of the head without contrast.  This ended 

up resulting as negative for any signs of a bleed, ischemic changes or any other

acute process.  The patient was seen by the telemedicine neurologist immediately

upon return from CT.  He obtained an NIH stroke scale of 2 but the patient was 

within the TPA window and the decision was made to administer TPA.  I was at 

bedside at the time of the discussion but there was a discussion between the 

family, the neurologist, and myself about the risks versus benefits of TPA and 

the patient and her family decided to go ahead with TPA administration.  Shortly

after receiving this medication, the patient started complaining of the acute 

headache so she was sent back for another CT scan of the head without contrast 

that still did not show any acute process.  Patient's labs came back showing 

some renal insufficiency with a GFR of about 35 that appears to be some acute 

kidney injury as she did not have any known history of kidney disease.  The 

neurologist had ordered and recommended a CT angiography study of the head and 

neck.  I once again had a long discussion with the patient and her family 

regarding the risks and benefits of having the study done with the known renal 

insufficiency.  We all decided that it was more beneficial to try and rule out a

thrombus in the patient did have the aphasia and is is within the window for a 

thrombectomy.  The patient was given 250 mL of IV fluid both before and after 

this contrasted study.  The CT angiography study did not show any thrombus but 

did show multiple areas of hemodynamically significant stenosis.  The patient 

has had some improvement in her aphasia and dysarthria.  She will be admitted to

the hospital for further evaluation and treatment and was accepted for admission

by the hospitalist, Dr. Posadas.








- Differential Diagnosis


CVA, TIA, Dysrythmia, Hypoglycemia


Critical Care Time: Yes


Critical care time in (mins) excluding proc time.: 45


Critical care attestation.: 


If time is entered above; I have spent that time in minutes in the direct care 

of this critically ill patient, excluding procedure time.  Critical care time 

was spent on this patient and during her initial evaluation, multiple re-

evaluations, ordering and interpretation of labs and imaging, discussion with 

the neurologist and hospitalist service, multiple discussions with the patient 

and her family.





Critical Care Time: 





45 minutes





ED Disposition


Clinical Impression: 


 Acute CVA (cerebrovascular accident), Acute kidney injury





HTN (hypertension)


Qualifiers:


 Hypertension type: essential hypertension Qualified Code(s): I10 - Essential 

(primary) hypertension





Disposition: DC-09 OP ADMIT IP TO THIS HOSP


Is pt being admited?: Yes


Condition: Serious

## 2019-09-12 NOTE — CAT SCAN REPORT
CT HEAD WITHOUT CONTRAST



INDICATION / CLINICAL INFORMATION:  Headache post TPA, with CVA.



TECHNIQUE: Axial imaging performed from the skull apex through the skull base without the use of cont
rast.  Sagittal and coronal reformatted images.  All CT scans at this location are performed using CT
 dose reduction for ALARA by means of automated exposure control. 



COMPARISON: 9/12/2019 at 1403 hours.



FINDINGS:



CEREBRAL PARENCHYMA: No significant abnormality. No acute territorial infarct. 

HEMORRHAGE: None.

EXTRA-AXIAL SPACES: Normal in size and morphology for the patient's age.

VENTRICULAR SYSTEM: Normal in size and morphology for the patient's age.

MIDLINE SHIFT OR HERNIATION: None.



CEREBELLUM / BRAINSTEM: No significant abnormality.



CALVARIUM: No significant abnormality.

ORBITS: Normal as visualized.

PARANASAL SINUSES / MASTOID AIR CELLS: Normal as visualized.

SOFT TISSUES of HEAD: No significant abnormality.



ADDITIONAL FINDINGS: None.



IMPRESSION:

No acute intracranial abnormality. No change since the CT head earlier today.



Signer Name: Levi Briscoe Jr, MD 

Signed: 9/12/2019 3:32 PM

 Workstation Name: [id number]

## 2019-09-13 NOTE — CONSULTATION
History of Present Illness


Consult date: 09/13/19


Reason for Consult: Stroke


Chief complaint: 


Difficulty speaking





History of present illness: 


Patient is a 68-year-old woman with a history of hypertension, anxiety, 

hyperlipidemia, diabetes, CAD, previous stroke with nose residual deficits.  

Yesterday at approximately 1:15 PM, the patient developed difficulty finding w

ords.  Her daughter came and assessed her, and noted that her glucose levels 

were in the 90s.  She was given juice and a piece of candy, and states that her 

symptoms did not improve significantly.  The patient was then brought to the ER.

 She was evaluated by telemetry neurology, and was felt to be a TPA candidate.  

Patient was given TPA in the ER.  Afterwards, patient states that her symptoms 

completely resolved.








Past History


Past Medical History: diabetes (abdomen bowel sounds call Ambien and medical.  

We favor the past 8 to January surgery and surgical history family history is 

social), hypertension, hyperlipidemia, stroke, other (anxiety)


Past Surgical History: hysterectomy, total knee replacement ( bilateral), Other 

(lower back surgery)


Social history: lives with family, smoking (in the past--smoked for 30 years), 

full code


Family history: hypertension





Medications and Allergies


                                    Allergies











Allergy/AdvReac Type Severity Reaction Status Date / Time


 


aspirin Allergy  Hives Verified 09/12/19 15:13


 


codeine Allergy  Hives Verified 09/12/19 15:13


 


Penicillins Allergy  Hives Verified 09/12/19 15:13











                                Home Medications











 Medication  Instructions  Recorded  Confirmed  Last Taken  Type


 


ALPRAZolam [Xanax] 1 mg PO DAILY 02/10/18 09/12/19 09/11/19 History


 


Amlodipine Besylate [Norvasc] 5 mg PO DAILY 02/10/18 09/12/19 09/11/19 History


 


Canagliflozin [Invokana] 300 mg PO DAILY 02/10/18 09/12/19 09/11/19 History


 


Clopidogrel Bisulfate [Plavix] 75 mg PO DAILY 02/10/18 09/12/19 09/12/19 History


 


Cyclobenzaprine [Flexeril 10 MG 10 mg PO DAILY PRN 02/10/18 09/12/19 09/08/19 

History





TAB]     


 


Diclofenac Sodium 75 mg PO DAILY PRN 02/10/18 09/12/19 09/11/19 History


 


Donepezil [Aricept] 5 mg PO DAILY 02/10/18 09/12/19 09/11/19 History


 


Linaclotide [Linzess] 145 mcg PO QDAY 02/10/18 09/12/19 09/11/19 History


 


Metoprolol Jay/Hydrochlorothiaz 100 mg PO DAILY 02/10/18 09/12/19 09/11/19 Hist

ory





[Metoprolol ER-Hctz 100-12.5 mg]     


 


Ondansetron [Zofran Odt] 4 mg PO DAILY PRN 02/10/18 09/12/19 09/11/19 History


 


Pantoprazole [Protonix TAB] 40 mg PO QDAY 02/10/18 09/12/19 09/11/19 History


 


Ranolazine [Ranexa] 1,000 mg PO BID 02/10/18 09/12/19 09/11/19 History


 


Rosuvastatin Calcium [Crestor] 40 mg PO DAILY 02/10/18 09/12/19 09/11/19 History


 


Sertraline [Zoloft] 100 mg PO HS 02/10/18 09/12/19 09/11/19 History


 


Sucralfate [Carafate] 1 gm PO ACHS 02/10/18 09/12/19 09/11/19 History


 


metFORMIN [Glucophage] 500 mg PO QDAY 02/10/18 09/12/19 09/11/19 History


 


ALBUTEROL Inhaler (OR & NICU) 2 puff IH QID PRN 30 Days 02/12/18 09/12/19 09/11/19 Rx





[Proair] inhalation    


 


Montelukast [Singulair] 10 mg PO QPM #30 tablet 02/12/18 09/12/19 09/08/19 Rx


 


Oxycodone HCl/Acetaminophen 1 each PO Q12HR PRN #15 tablet 09/03/19 09/12/19 09/11/19 Rx





[Percocet 10/325 mg]     











Active Meds: 


Active Medications





Acetaminophen (Tylenol)  650 mg PO Q4H PRN


   PRN Reason: Pain MILD(1-3)/Fever >100.5/HA


Albuterol (Proventil)  2.5 mg IH Q4HRT PRN


   PRN Reason: Shortness Of Breath


Alprazolam (Xanax)  1 mg PO QHS [facility]


   Last Admin: 09/12/19 22:28 Dose:  1 mg


   Documented by: 


Amlodipine Besylate (Norvasc)  5 mg PO DAILY [facility]


   Last Admin: 09/13/19 10:37 Dose:  5 mg


   Documented by: 


Aspirin (Aspirin)  325 mg PO QDAY [facility]


   Last Admin: 09/13/19 10:36 Dose:  Not Given


   Documented by: 


Atorvastatin Calcium (Lipitor)  80 mg PO QHS [facility]


   Last Admin: 09/12/19 22:28 Dose:  80 mg


   Documented by: 


Clopidogrel Bisulfate (Plavix)  75 mg PO DAILY [facility]


Donepezil HCl (Aricept)  5 mg PO DAILY [facility]


   Last Admin: 09/13/19 10:36 Dose:  Not Given


   Documented by: 


Hydromorphone HCl (Dilaudid)  0.5 mg IV Q3H PRN


   PRN Reason: Pain , Severe (7-10)


Insulin Human Lispro (Humalog)  0 unit SUB-Q ACHS [facility]; Protocol


   Last Admin: 09/13/19 16:30 Dose:  Not Given


   Documented by: 


Metoprolol Succinate (Toprol Xl)  100 mg PO QDAY [facility]


   Last Admin: 09/13/19 10:39 Dose:  100 mg


   Documented by: 


Miscellaneous Medication (Linaclotide [Linzess])  145 mcg PO QDAY [facility]


   Last Admin: 09/13/19 10:37 Dose:  Not Given


   Documented by: 


Montelukast Sodium (Singulair)  10 mg PO QPM [facility]


Ondansetron HCl (Zofran)  4 mg IV Q8H PRN


   PRN Reason: Nausea And Vomiting


Oxycodone/Acetaminophen (Percocet 5/325)  1 tab PO Q6H PRN


   PRN Reason: Pain, Moderate (4-6)


   Last Admin: 09/12/19 20:36 Dose:  1 tab


   Documented by: 


Oxycodone/Acetaminophen (Percocet 5/325)  1 tab PO Q8H PRN


   PRN Reason: Pain, Moderate (4-6)


   Stop: 09/18/19 23:59


Pantoprazole Sodium (Protonix)  40 mg PO QDAY [facility]


   Last Admin: 09/13/19 10:38 Dose:  40 mg


   Documented by: 


Ranolazine (Ranexa Er)  1,000 mg PO BID [facility]


   Last Admin: 09/13/19 10:38 Dose:  1,000 mg


   Documented by: 


Sertraline HCl (Zoloft)  100 mg PO HS [facility]


   Last Admin: 09/12/19 22:48 Dose:  100 mg


   Documented by: 


Sodium Chloride (Sodium Chloride Flush Syringe 10 Ml)  10 ml IV BID [facility]


   Last Admin: 09/13/19 10:39 Dose:  Not Given


   Documented by: 


Sodium Chloride (Sodium Chloride Flush Syringe 10 Ml)  10 ml IV PRN PRN


   PRN Reason: LINE FLUSH


Sucralfate (Carafate)  1 gm PO ACHS [facility]


   Last Admin: 09/13/19 16:34 Dose:  1 gm


   Documented by: 











Review of Systems


All systems: negative


Neurological: change in speech





Physical Examination





- Vital Signs


Vital Signs: 


                                   Vital Signs











Pulse Resp


 


 76   15 


 


 09/12/19 14:10  09/12/19 14:10














- Constitutional


General appearance: comfortable





- EENT


EENT: Present: ATNC, PERRL, mucous membranes moist, hearing intact, vision 

intact





- Respiratory


Respiratory: Present: lungs clear, normal breath sounds





- Cardiovascular


Cardiovascular: Present: regular rate, normal S1, normal S2


Extremities: Present: no clubbing, cyanosis, no inflammation





- Gastrointestinal


Gastrointestinal: Present: normoactive bowel sounds, soft, non-tender





- Integumentary


Integumentary: Present: normal





- Neurologic


Cranial nerve examination: PERRL, EOMI, VFF, V1/V2/V3 grossly intact, face 

symmetric, tongue midline, intact shoulder shrug


Sensorimotor examination: intact


Detailed motor examination: full strength in all romulo


Motor examination - right side: 5/5: biceps, triceps, wrist flexion, wrist 

extension, , hip flexors, knee extensors, dorsiflexion, toe extension (EHL),

 plantarflexion


Motor examination - left side: 5/5: biceps, triceps, wrist flexion, wrist 

extension, , hip flexors, knee extensors, dorsiflexion, toe extension (EHL),

 plantarflexion


Detailed sensory examination: intact, light touch


Reflexes: 2+: ankle, bicep, knee, tricep


Cerebellar examination: other (bilaterally intact to finger to nose and heel to 

shin)





- Musculoskeletal


Musculoskeletal: Present: no fluid collection, no pain





- Psychiatric


Psychiatric: Present: mood/affect appropriate





- Level of Consciousness


1a. Level of Consciousness: alert/keenly responsive





- LOC Questions


1b. LOC Questions: answers both correctly





- LOC Command


1c. LOC Commands: performs tasks correctly





- Best Gaze


2. Best Gaze: normal





- Visual


3. Visual: no visual loss





- Facial Palsy


4. Facial Palsy: normal symmetrical movement





- Motor Arm


5a. Motor Arm Left: no drift


5b. Motor Arm Right: no drift





- Motor Leg


6a. Motor Leg Left: no drift


6b. Motor Leg Right: no drift





- Limb Ataxia


7. Limb Ataxia: absent





- Sensory


8. Sensory: normal





- Best Language


9. Best Language: no aphasia





- Dysarthria


10. Dysarthria: normal





- Extinction and Inattention


11. Extinction/Inattention: no abnormality





- Scoring


Total Score: 0


Stroke Severity: No Stroke Symptoms





Results





- Laboratory Findings


CBC and BMP: 


                                 09/12/19 15:05





                                 09/13/19 09:37


Abnormal Lab Findings: 


                                  Abnormal Labs











  09/12/19 09/12/19 09/12/19





  15:05 15:05 15:05


 


WBC  4.3 L  


 


Plt Count  134 L  


 


Mono % (Auto)  10.7 H  


 


Thrombin Time   20.9 H 


 


Sodium    135 L


 


Carbon Dioxide    20 L


 


BUN    28 H


 


Creatinine    1.5 H


 


Total Creatine Kinase    145 H


 


CK-MB (CK-2)    4.8 H














  09/13/19





  09:37


 


WBC 


 


Plt Count 


 


Mono % (Auto) 


 


Thrombin Time 


 


Sodium 


 


Carbon Dioxide 


 


BUN  24 H


 


Creatinine  1.4 H


 


Total Creatine Kinase 


 


CK-MB (CK-2) 














Assessment and Plan


Patient is a 68-year-old woman with a history of hypertension, anxiety, 

hyperlipidemia, diabetes, CAD, previous stroke with no residual deficits, who 

presented with difficulty with word finding yesterday.  The patient received TPR

 in the ER yesterday, after which her symptoms resolved.  According the 

patient's clinical findings, it is possible that the patient has had a TIA, 

anteverted stroke, or conversion disorder.





Plan:


1. Possible averted stroke vs. TIA vs. conversion d/o:


- Patient given tPA yesterday.  Continue to follow post-TPA protocol.


- MRI brain did not reveal any evidence of acute infarct.


CTA head and neck revealed bilateral ICA cavernous segment stenosis, and left 

vertebral artery stenosis


Echocardiogram: EF 45-50%, bubble study negative, left atrium normal size.


- Continue patient on Plavix 24 hours after tPA complete, as she states that she

 has an allergy to aspirin


Continue atorvastatin.  Current LDL 83, goal LDL less than 70.  

Exlinetelemetry monitoring while in-house.


PT/OT/ST 


- Telemetry monitoring while in-house.


DVT prophylaxis: Recommend Lovenox 24 hours after TPA complete.  


Recommend cardiac monitoring with 30 day MCOT or ILR as outpatient. 





2. Hypertension:


- Recommend BP goal of normotension, as MRI did not reveal any evidence of 

infarct.





Will sign off, as I'm not covering neurology service over the weekend.  Please 

consult neurologist covering the weekend for further neurologic management and 

monitoring.





Thank you for allowing me to take part in the care of this patient.





Christiano Webber MD


Neurology

## 2019-09-13 NOTE — CAT SCAN REPORT
CT angio neck



INDICATION / CLINICAL INFORMATION:

68 years Female; stroke.



TECHNIQUE: Thin cut axial images obtained through the head during IV bolus contrast administration. S
agittal, coronal, and 3 plane MIP reconstructions performed by the technologist. NASCET type criteria
 used evaluate stenoses. All CT scans at this location are performed using CT dose reduction for ALAR
A by means of automated exposure control.



COMPARISON:

None available.



FINDINGS: 



ARCH: Bovine arch configuration noted.



CAROTID ARTERIES: The visualized common and internal carotid arteries are patent. Atherosclerotic dis
ease is seen in the carotid bifurcation region on the left, in the prevertebral space at approximatel
y the C4 level-this finding not felt to be hemodynamically significant. The carotid arteries are tort
uous-would question history of hypertension. The internal carotid arteries are otherwise widely paten
t.



VERTEBRAL ARTERIES: Codominant vertebral system seen. Focal area of narrowing seen at the C7 level in
 the left vertebral artery-mild to moderate in degree. Areas of minimal narrowing seen in the right v
ertebral artery, related to atherosclerotic disease.



ADDITIONAL FINDINGS: There is a heterogeneous appearance in the thyroid lobes, although no dominant n
odule is seen. Follow-up with ultrasound as clinically warranted.



Peripheral, pulmonary nodule is seen in the anterior segment of the left upper lobe with smaller sameer
cent nodule identified. Comparison with any prior CT of the chest would be helpful, if available. Thi
s finding measures up to 2.4 cm in maximum dimension. Otherwise, postcontrast CT may be of benefit. S
carring type changes seen in both lung apices as well.



Mild degenerative changes seen along the cervical spine.



IMPRESSION: 

1. Focal area of narrowing in the left vertebral artery as described above.

2. Pulmonary nodule in the left upper lobe suggested. Comparison with prior exam would be helpful, if
 available.



Signer Name: Acosta Otero MD 

Signed: 9/13/2019 5:47 AM

 Workstation Name: [id number]

## 2019-09-13 NOTE — CAT SCAN REPORT
CT HEAD WITHOUT CONTRAST



INDICATION / CLINICAL INFORMATION:  numbness on left side of face.



TECHNIQUE: Axial imaging performed from the skull apex through the skull base without the use of cont
rast.  Sagittal and coronal reformatted images.  All CT scans at this location are performed using CT
 dose reduction for ALARA by means of automated exposure control. 



COMPARISON: 9/12/2019



FINDINGS:



CEREBRAL PARENCHYMA: No significant abnormality. No acute territorial infarct. 

HEMORRHAGE: None.

EXTRA-AXIAL SPACES: Slightly prominent subdural spaces particularly in the frontoparietal regions pradeep
aterally which is probably secondary to volume loss. Chronic subdural collections could be considered
 but is thought less likely.

VENTRICULAR SYSTEM: Normal in size and morphology for the patient's age.

MIDLINE SHIFT OR HERNIATION: None.



CEREBELLUM / BRAINSTEM: No significant abnormality.



CALVARIUM: No significant abnormality.

ORBITS: Normal as visualized.

PARANASAL SINUSES / MASTOID AIR CELLS: Normal as visualized.

SOFT TISSUES of HEAD: No significant abnormality.



ADDITIONAL FINDINGS: None.



IMPRESSION:

No acute intracranial abnormality. No significant change since yesterday's exam.



Signer Name: Levi Briscoe Jr, MD 

Signed: 9/13/2019 8:41 AM

 Workstation Name: [id number]

## 2019-09-13 NOTE — PROGRESS NOTE
Assessment and Plan


Assessment and plan: 





(1) Acute CVA (cerebrovascular accident)


Current Visit: Yes   Status: Acute   


Plan to address problem: 


Patient has slurred speech which resolved


No residual deficits


Tele neurology recommended TPA


TPA was given in the emergency room


Admitted to ICU and now transferred to tele


MRI and echocardiogram unremarkable


MRA and carotid duplex scan were not ordered because CT angiogram of the head 

and neck were done


Neurology consult requested


Aspirin to be given


Patient on Plavix








(2) HTN (hypertension)


Current Visit: No   Status: Chronic   


Qualifiers: 


   Hypertension type: essential hypertension   Qualified Code(s): I10 - 

Essential (primary) hypertension   


Plan to address problem: 


Continue antihypertensives








(3) Type 2 diabetes mellitus


Current Visit: Yes   Status: Chronic   


Qualifiers: 


   Diabetes mellitus long term insulin use: unspecified long term insulin use 

status 


Plan to address problem: 


Continue hypoglycemics and coverage with insulin


Hemoglobin A1c 5.7








(4) Closed head injury


Current Visit: No   Status: Acute   


Qualifiers: 


   Encounter type: initial encounter   Qualified Code(s): S09.90XA - Unspecified

injury of head, initial encounter   


Plan to address problem: 


Happen while 10 days ago with periorbital ecchymosis and a large hematoma.  

Patient wants to have I/d but deferred because of TPA.  Can be done as an 

outpatient later.  Not emergent











(5) Acute kidney injury (KATY) with acute tubular necrosis (ATN)


Current Visit: Yes   Status: Acute   


Plan to address problem: 


IV fluids for now


Improving








(6) Hyponatremia


Current Visit: Yes   Status: Acute   


Plan to address problem: 


Very mild and resolved








(7) DVT prophylaxis


Current Visit: No   Status: Acute   


Plan to address problem: 


On Lovenox from tomorrow and GI prophylaxis








Hospitalist Physical





- Constitutional


Vitals: 


                                        











Temp Pulse Resp BP Pulse Ox


 


 97.6 F   79   13   142/86   95 


 


 09/12/19 20:56  09/13/19 14:32  09/13/19 14:32  09/13/19 14:32  09/13/19 14:32











General appearance: Present: no acute distress, well-nourished





Results





- Labs


CBC & Chem 7: 


                                 09/12/19 15:05





                                 09/13/19 09:37


Labs: 


                             Laboratory Last Values











WBC  4.3 K/mm3 (4.5-11.0)  L  09/12/19  15:05    


 


RBC  4.15 M/mm3 (3.65-5.03)   09/12/19  15:05    


 


Hgb  12.6 gm/dl (10.1-14.3)   09/12/19  15:05    


 


Hct  38.1 % (30.3-42.9)   09/12/19  15:05    


 


MCV  92 fl (79-97)   09/12/19  15:05    


 


MCH  30 pg (28-32)   09/12/19  15:05    


 


MCHC  33 % (30-34)   09/12/19  15:05    


 


RDW  14.0 % (13.2-15.2)   09/12/19  15:05    


 


Plt Count  134 K/mm3 (140-440)  L  09/12/19  15:05    


 


Lymph % (Auto)  29.6 % (13.4-35.0)   09/12/19  15:05    


 


Mono % (Auto)  10.7 % (0.0-7.3)  H  09/12/19  15:05    


 


Eos % (Auto)  3.5 % (0.0-4.3)   09/12/19  15:05    


 


Baso % (Auto)  0.5 % (0.0-1.8)   09/12/19  15:05    


 


Lymph #  1.3 K/mm3 (1.2-5.4)   09/12/19  15:05    


 


Mono #  0.5 K/mm3 (0.0-0.8)   09/12/19  15:05    


 


Eos #  0.2 K/mm3 (0.0-0.4)   09/12/19  15:05    


 


Baso #  0.0 K/mm3 (0.0-0.1)   09/12/19  15:05    


 


Seg Neutrophils %  55.7 % (40.0-70.0)   09/12/19  15:05    


 


Seg Neutrophils #  2.4 K/mm3 (1.8-7.7)   09/12/19  15:05    


 


PT  12.8 Sec. (12.2-14.9)   09/12/19  15:05    


 


INR  0.99  (0.87-1.13)   09/12/19  15:05    


 


APTT  25.9 Sec. (24.2-36.6)   09/12/19  15:05    


 


  20.9 Sec. (15.1-19.6)  H  09/12/19  15:05    


 


Sodium  139 mmol/L (137-145)   09/13/19  09:37    


 


Potassium  4.5 mmol/L (3.6-5.0)   09/13/19  09:37    


 


Chloride  103.9 mmol/L ()   09/13/19  09:37    


 


Carbon Dioxide  25 mmol/L (22-30)   09/13/19  09:37    


 


  15 mmol/L  09/13/19  09:37    


 


BUN  24 mg/dL (7-17)  H  09/13/19  09:37    


 


  1.4 mg/dL (0.7-1.2)  H  09/13/19  09:37    


 


Estimated GFR  37 ml/min  09/13/19  09:37    


 


  17 %  09/13/19  09:37    


 


Glucose  90 mg/dL ()   09/13/19  09:37    


 


POC Glucose  101  ()   09/12/19  14:06    


 


  5.7 % (4-6)   09/12/19  15:05    


 


Calcium  9.7 mg/dL (8.4-10.2)   09/13/19  09:37    


 


  0.30 mg/dL (0.1-1.2)   09/13/19  09:37    


 


AST  22 units/L (5-40)   09/13/19  09:37    


 


ALT  13 units/L (7-56)   09/13/19  09:37    


 


  103 units/L ()   09/13/19  09:37    


 


  145 units/L ()  H  09/12/19  15:05    


 


CK-MB (CK-2)  4.8 ng/mL (0.0-4.0)  H  09/12/19  15:05    


 


CK-MB (CK-2) Rel Index  3.3  (0-4)   09/12/19  15:05    


 


  < 0.010 ng/mL (0.00-0.029)   09/12/19  18:46    


 


  6.8 g/dL (6.3-8.2)   09/13/19  09:37    


 


  4.0 g/dL (3.9-5)   09/13/19  09:37    


 


  1.4 %  09/13/19  09:37    


 


Triglycerides  58 mg/dL (2-149)   09/13/19  09:37    


 


Cholesterol  139 mg/dL ()   09/13/19  09:37    


 


  83 mg/dL ()   09/13/19  09:37    


 


  56 mg/dL (40-59)   09/13/19  09:37    


 


  2.48 %  09/13/19  09:37    


 


  Yellow  (Yellow)   09/12/19  16:28    


 


  Clear  (Clear)   09/12/19  16:28    


 


  5.0  (5.0-7.0)   09/12/19  16:28    


 


Ur Specific Gravity  1.005  (1.003-1.030)   09/12/19  16:28    


 


  <15 mg/dl mg/dL (Negative)   09/12/19  16:28    


 


  >=500 mg/dL (Negative)   09/12/19  16:28    


 


  Neg mg/dL (Negative)   09/12/19  16:28    


 


  Neg  (Negative)   09/12/19  16:28    


 


  Neg  (Negative)   09/12/19  16:28    


 


  Neg  (Negative)   09/12/19  16:28    


 


  < 2.0 mg/dL (<2.0)   09/12/19  16:28    


 


Ur Leukocyte Esterase  Neg  (Negative)   09/12/19  16:28    


 


  < 1.0 /HPF (0.0-6.0)   09/12/19  16:28    


 


  1.0 /HPF (0.0-6.0)   09/12/19  16:28    


 


U Epithel Cells (Auto)  2.0 /HPF (0-13.0)   09/12/19  16:28    


 


  Presumptive negative   09/12/19  16:28    


 


  Presumptive negative   09/12/19  16:28    


 


Ur Barbiturates Screen  Presumptive negative   09/12/19  16:28    


 


Ur Phencyclidine Scrn  Presumptive negative   09/12/19  16:28    


 


Ur Amphetamines Screen  Presumptive negative   09/12/19  16:28    


 


U Benzodiazepines Scrn  Presumptive negative   09/12/19  16:28    


 


  Presumptive negative   09/12/19  16:28    


 


U Marijuana (THC) Screen  Presumptive negative   09/12/19  16:28    


 


  Disclamer   09/12/19  16:28    


 


Plasma/Serum Alcohol  < 0.01 % (0-0.07)   09/12/19  15:05    


 


Blood Type  O POSITIVE   09/12/19  15:05    


 


Antibody Screen  Negative   09/12/19  15:05    














Active Medications





- Current Medications


Current Medications: 














Generic Name Dose Route Start Last Admin





  Trade Name Freq  PRN Reason Stop Dose Admin


 


Acetaminophen  650 mg  09/12/19 20:07 





  Tylenol  PO  





  Q4H PRN  





  Pain MILD(1-3)/Fever >100.5/HA  


 


Albuterol  2.5 mg  09/12/19 20:19 





  Proventil  IH  





  Q4HRT PRN  





  Shortness Of Breath  


 


Alprazolam  1 mg  09/12/19 22:00  09/12/19 22:28





  Xanax  PO   1 mg





  QHS [facility]   Administration


 


Amlodipine Besylate  5 mg  09/12/19 21:00  09/13/19 10:37





  Norvasc  PO   5 mg





  DAILY [facility]   Administration


 


Aspirin  325 mg  09/13/19 10:00  09/13/19 10:36





  Aspirin  PO   Not Given





  QDAY [facility]  


 


Atorvastatin Calcium  80 mg  09/12/19 22:00  09/12/19 22:28





  Lipitor  PO   80 mg





  QHS [facility]   Administration


 


Clopidogrel Bisulfate  75 mg  09/13/19 21:00 





  Plavix  PO  





  DAILY [facility]  


 


Donepezil HCl  5 mg  09/13/19 10:00  09/13/19 10:36





  Aricept  PO   Not Given





  DAILY [facility]  


 


Hydromorphone HCl  0.5 mg  09/12/19 20:08 





  Dilaudid  IV  





  Q3H PRN  





  Pain , Severe (7-10)  


 


Insulin Human Lispro  0 unit  09/12/19 22:00  09/13/19 10:11





  Humalog  SUB-Q   Not Given





  [facility]  





  Protocol  


 


Metoprolol Succinate  100 mg  09/12/19 21:00  09/13/19 10:39





  Toprol Xl  PO   100 mg





  QDAY [facility]   Administration


 


Miscellaneous Medication  145 mcg  09/13/19 10:00  09/13/19 10:37





  Linaclotide [Linzess]  PO   Not Given





  QDAY [facility]  


 


Montelukast Sodium  10 mg  09/13/19 18:00 





  Singulair  PO  





  QPM [facility]  


 


Ondansetron HCl  4 mg  09/12/19 20:07 





  Zofran  IV  





  Q8H PRN  





  Nausea And Vomiting  


 


Oxycodone/Acetaminophen  1 tab  09/12/19 20:08  09/12/19 20:36





  Percocet 5/325  PO   1 tab





  Q6H PRN   Administration





  Pain, Moderate (4-6)  


 


Oxycodone/Acetaminophen  1 tab  09/13/19 10:51 





  Percocet 5/325  PO  09/18/19 23:59 





  Q8H PRN  





  Pain, Moderate (4-6)  


 


Pantoprazole Sodium  40 mg  09/13/19 10:00  09/13/19 10:38





  Protonix  PO   40 mg





  QDAY DELORES   Administration


 


Ranolazine  1,000 mg  09/12/19 22:00  09/13/19 10:38





  Ranexa Er  PO   1,000 mg





  BID DELORES   Administration


 


Sertraline HCl  100 mg  09/12/19 22:00  09/12/19 22:48





  Zoloft  PO   100 mg





  [facility] DELORES   Administration


 


Sodium Chloride  10 ml  09/12/19 22:00  09/13/19 10:39





  Sodium Chloride Flush Syringe 10 Ml  IV   Not Given





  BID DELORES  


 


Sodium Chloride  10 ml  09/12/19 20:09 





  Sodium Chloride Flush Syringe 10 Ml  IV  





  PRN PRN  





  LINE FLUSH  


 


Sucralfate  1 gm  09/12/19 22:00  09/13/19 11:00





  Carafate  PO   Not Given





  [facility]

## 2019-09-13 NOTE — CAT SCAN REPORT
CT angio head



INDICATION / CLINICAL INFORMATION:

68 years Female; stroke.



TECHNIQUE: Thin cut axial images obtained through the head during IV bolus contrast administration. S
agittal, coronal, and 3 plane MIP reconstructions performed by the technologist. NASCET type criteria
 used evaluate stenoses. Automated exposure control utilized for radiation reduction purposes.



COMPARISON: 

None available.



FINDINGS:



INTERNAL CAROTID ARTERIES: Atherosclerotic disease seen in the cavernous portions of both internal ca
rotid arteries. There may be hemodynamically significant narrowing on the right.

VERTEBROBASILAR SYSTEM: No significant narrowing appreciated.



DISTAL BRANCHES: Distal branches of the anterior, middle, and posterior cerebral arteries are fairly 
symmetric in appearance and number. There is a focal area of narrowing in the distal A1 segment on th
e right.



ANEURYSM: None identified.



ADDITIONAL FINDINGS: Arachnoid cyst seen along the intraparietal sulcus on the left. 



IMPRESSION:

Areas of narrowing identified, as described above.



Signer Name: Acosta Otero MD 

Signed: 9/13/2019 5:48 AM

 Workstation Name: [id number]

## 2019-09-13 NOTE — MAGNETIC RESONANCE REPORT
MR brain wo con



INDICATION / CLINICAL INFORMATION:

68 years Female; stroke. 68 old female left facial numbness



TECHNIQUE: 

Multiplanar, multisequence MR images of the brain were obtained.



COMPARISON: 

9/13/2019 - CT



FINDINGS:



BRAIN / INTRACRANIAL CONTENTS: Arachnoid cyst seen in the intraparietal sulcus on the left with mild 
mass effect. This should be of no clinical significance. There are also small arachnoid cysts near th
e paracentral lobule/precuneus regions bilaterally. 



Mild cerebral and cerebellar atrophy.



There are mild areas of increased signal intensity on FLAIR imaging in the white matter of the cerebr
al hemispheres. These are nonspecific findings and may be related to microangiopathy (hypertension, d
iabetes, atherosclerosis), given the patient's age.



Otherwise, no acute ischemia, acute hemorrhage, or hydrocephalus. 



CRANIOCERVICAL JUNCTION: No significant abnormality.

VASCULAR FLOW-VOIDS: No significant abnormality.



ORBITS: No significant abnormality of visualized orbits. Subcutaneous hematoma seen in the lateral pe
riorbital region on the left.

SINUSES / MASTOIDS: There is mild mucosal thickening in the ethmoids. Mild mucosal thickening also se
en in the inferior mastoids.



ADDITIONAL FINDINGS: None. 



IMPRESSION:

1. No focal intra-axial mass, intracranial hemorrhage, hydrocephalus, or acute ischemia.



Signer Name: Den Naik MD, III 

Signed: 9/13/2019 10:26 AM

 Workstation Name: [id number]

## 2019-09-14 NOTE — PROGRESS NOTE
Assessment and Plan


Assessment and plan: 





(1) Acute CVA (cerebrovascular accident)


Current Visit: Yes   Status: Acute   


Plan to address problem: 


Patient has slurred speech which resolved


No residual deficits


Tele neurology recommended TPA


TPA was given in the emergency room


Admitted to ICU and now transferred to tele


MRI and echocardiogram unremarkable


MRA and carotid duplex scan were not ordered because CT angiogram of the head 

and neck were done


Neurology consult requested


Patient is allergic to aspirin


Patient on Plavix and statin


Patient was evaluated by neurology and cardiology


Cardiology will see her in the office in 3 days to get Holter monitoring





(2) HTN (hypertension)


Current Visit: No   Status: Chronic   


Qualifiers: 


   Hypertension type: essential hypertension   Qualified Code(s): I10 - 

Essential (primary) hypertension   


Plan to address problem: 


Continue antihypertensives


uncontrolled, adjusted BP meds





(3) Type 2 diabetes mellitus


Current Visit: Yes   Status: Chronic   


Qualifiers: 


   Diabetes mellitus long term insulin use: unspecified long term insulin use 

status 


Plan to address problem: 


Continue hypoglycemics and coverage with insulin


Hemoglobin A1c 5.7








(4) Closed head injury


Current Visit: No   Status: Acute   


Qualifiers: 


   Encounter type: initial encounter   Qualified Code(s): S09.90XA - Unspecified

injury of head, initial encounter   


Plan to address problem: 


Happen while 10 days ago with periorbital ecchymosis and a large hematoma.  

Patient wants to have I/d but deferred because of TPA.  Can be done as an outp

atient later.  Not emergent











(5) Acute kidney injury (KATY) with acute tubular necrosis (ATN)


Current Visit: Yes   Status: Acute   


Plan to address problem: 


Resolved








(6) Hyponatremia


Current Visit: Yes   Status: Acute   


Plan to address problem: 


Very mild and resolved








(7) DVT prophylaxis


Current Visit: No   Status: Acute   


Plan to address problem: 


On Lovenox 








- Patient Problems


(1) Acute CVA (cerebrovascular accident)


Current Visit: Yes   Status: Acute   





(2) Acute kidney injury


Current Visit: Yes   Status: Acute   





(3) HTN (hypertension)


Current Visit: Yes   Status: Acute   





(4) Type 2 diabetes mellitus


Current Visit: Yes   Status: Chronic   


Qualifiers: 


   Diabetes mellitus long term insulin use: unspecified long term insulin use 

status 





History


Interval history: 





Patient was seen and evaluated this morning, patient didn't have any complaints.





Hospitalist Physical





- Physical exam


Narrative exam: 


Not in cardiopulmonary distress. 


 The patient appeared well nourished and normally developed.


 Vital signs as documented.


 Head exam is unremarkable.


 Face has facial bruise and hematoma.


 No scleral icterus .


 Neck is without jugular venous distension, thyromegaly, or carotid bruits. 


 Lungs are clear to auscultation.


Cardiac exam reveals regular rate and  Rhythm. First and second heart sounds 

normal. No murmurs, rubs or gallops. 


Abdominal exam reveals normal bowel sounds, no masses, no organomegaly and no 

aortic enlargement. 


Extremities are nonedematous and both femoral and pedal pulses are normal.


CNS: Alert and oriented 3.  No focal weakness.





- Constitutional


Vitals: 


                                        











Temp Pulse Resp BP Pulse Ox


 


 98.0 F   62   20   178/92   100 


 


 09/14/19 12:21  09/14/19 12:21  09/14/19 12:21  09/14/19 12:21  09/14/19 12:21











General appearance: Present: no acute distress





Results





- Labs


CBC & Chem 7: 


                                 09/12/19 15:05





                                 09/14/19 07:17


Labs: 


                             Laboratory Last Values











WBC  4.3 K/mm3 (4.5-11.0)  L  09/12/19  15:05    


 


RBC  4.15 M/mm3 (3.65-5.03)   09/12/19  15:05    


 


Hgb  12.6 gm/dl (10.1-14.3)   09/12/19  15:05    


 


Hct  38.1 % (30.3-42.9)   09/12/19  15:05    


 


MCV  92 fl (79-97)   09/12/19  15:05    


 


MCH  30 pg (28-32)   09/12/19  15:05    


 


MCHC  33 % (30-34)   09/12/19  15:05    


 


RDW  14.0 % (13.2-15.2)   09/12/19  15:05    


 


Plt Count  134 K/mm3 (140-440)  L  09/12/19  15:05    


 


Lymph % (Auto)  29.6 % (13.4-35.0)   09/12/19  15:05    


 


Mono % (Auto)  10.7 % (0.0-7.3)  H  09/12/19  15:05    


 


Eos % (Auto)  3.5 % (0.0-4.3)   09/12/19  15:05    


 


Baso % (Auto)  0.5 % (0.0-1.8)   09/12/19  15:05    


 


Lymph #  1.3 K/mm3 (1.2-5.4)   09/12/19  15:05    


 


Mono #  0.5 K/mm3 (0.0-0.8)   09/12/19  15:05    


 


Eos #  0.2 K/mm3 (0.0-0.4)   09/12/19  15:05    


 


Baso #  0.0 K/mm3 (0.0-0.1)   09/12/19  15:05    


 


Seg Neutrophils %  55.7 % (40.0-70.0)   09/12/19  15:05    


 


Seg Neutrophils #  2.4 K/mm3 (1.8-7.7)   09/12/19  15:05    


 


PT  12.8 Sec. (12.2-14.9)   09/12/19  15:05    


 


INR  0.99  (0.87-1.13)   09/12/19  15:05    


 


APTT  25.9 Sec. (24.2-36.6)   09/12/19  15:05    


 


  20.9 Sec. (15.1-19.6)  H  09/12/19  15:05    


 


Sodium  140 mmol/L (137-145)   09/14/19  07:17    


 


Potassium  4.4 mmol/L (3.6-5.0)   09/14/19  07:17    


 


Chloride  104.3 mmol/L ()   09/14/19  07:17    


 


Carbon Dioxide  23 mmol/L (22-30)   09/14/19  07:17    


 


  17 mmol/L  09/14/19  07:17    


 


BUN  18 mg/dL (7-17)  H  09/14/19  07:17    


 


  1.1 mg/dL (0.7-1.2)   09/14/19  07:17    


 


Estimated GFR  49 ml/min  09/14/19  07:17    


 


  16 %  09/14/19  07:17    


 


Glucose  89 mg/dL ()   09/14/19  07:17    


 


POC Glucose  76  ()   09/14/19  12:27    


 


  5.7 % (4-6)   09/12/19  15:05    


 


Calcium  10.0 mg/dL (8.4-10.2)   09/14/19  07:17    


 


  0.30 mg/dL (0.1-1.2)   09/13/19  09:37    


 


AST  22 units/L (5-40)   09/13/19  09:37    


 


ALT  13 units/L (7-56)   09/13/19  09:37    


 


  103 units/L ()   09/13/19  09:37    


 


  145 units/L ()  H  09/12/19  15:05    


 


CK-MB (CK-2)  4.8 ng/mL (0.0-4.0)  H  09/12/19  15:05    


 


CK-MB (CK-2) Rel Index  3.3  (0-4)   09/12/19  15:05    


 


  < 0.010 ng/mL (0.00-0.029)   09/12/19  18:46    


 


  6.8 g/dL (6.3-8.2)   09/13/19  09:37    


 


  4.0 g/dL (3.9-5)   09/13/19  09:37    


 


  1.4 %  09/13/19  09:37    


 


Triglycerides  58 mg/dL (2-149)   09/13/19  09:37    


 


Cholesterol  139 mg/dL ()   09/13/19  09:37    


 


  83 mg/dL ()   09/13/19  09:37    


 


  56 mg/dL (40-59)   09/13/19  09:37    


 


  2.48 %  09/13/19  09:37    


 


  Yellow  (Yellow)   09/12/19  16:28    


 


  Clear  (Clear)   09/12/19  16:28    


 


  5.0  (5.0-7.0)   09/12/19  16:28    


 


Ur Specific Gravity  1.005  (1.003-1.030)   09/12/19  16:28    


 


  <15 mg/dl mg/dL (Negative)   09/12/19  16:28    


 


  >=500 mg/dL (Negative)   09/12/19  16:28    


 


  Neg mg/dL (Negative)   09/12/19  16:28    


 


  Neg  (Negative)   09/12/19  16:28    


 


  Neg  (Negative)   09/12/19  16:28    


 


  Neg  (Negative)   09/12/19  16:28    


 


  < 2.0 mg/dL (<2.0)   09/12/19  16:28    


 


Ur Leukocyte Esterase  Neg  (Negative)   09/12/19  16:28    


 


  < 1.0 /HPF (0.0-6.0)   09/12/19  16:28    


 


  1.0 /HPF (0.0-6.0)   09/12/19  16:28    


 


U Epithel Cells (Auto)  2.0 /HPF (0-13.0)   09/12/19  16:28    


 


  Presumptive negative   09/12/19  16:28    


 


  Presumptive negative   09/12/19  16:28    


 


Ur Barbiturates Screen  Presumptive negative   09/12/19  16:28    


 


Ur Phencyclidine Scrn  Presumptive negative   09/12/19  16:28    


 


Ur Amphetamines Screen  Presumptive negative   09/12/19  16:28    


 


U Benzodiazepines Scrn  Presumptive negative   09/12/19  16:28    


 


  Presumptive negative   09/12/19  16:28    


 


U Marijuana (THC) Screen  Presumptive negative   09/12/19  16:28    


 


  Disclamer   09/12/19  16:28    


 


Plasma/Serum Alcohol  < 0.01 % (0-0.07)   09/12/19  15:05    


 


Blood Type  O POSITIVE   09/12/19  15:05    


 


Antibody Screen  Negative   09/12/19  15:05    














Active Medications





- Current Medications


Current Medications: 














Generic Name Dose Route Start Last Admin





  Trade Name Freq  PRN Reason Stop Dose Admin


 


Acetaminophen  650 mg  09/12/19 20:07 





  Tylenol  PO  





  Q4H PRN  





  Pain MILD(1-3)/Fever >100.5/HA  


 


Albuterol  2.5 mg  09/12/19 20:19 





  Proventil  IH  





  Q4HRT PRN  





  Shortness Of Breath  


 


Alprazolam  1 mg  09/12/19 22:00  09/13/19 21:51





  Xanax  PO   1 mg





  QHS [facility]   Administration


 


Amlodipine Besylate  10 mg  09/14/19 07:37  09/14/19 10:28





  Norvasc  PO   10 mg





  DAILY [facility]OR[facility]   Administration


 


Atorvastatin Calcium  80 mg  09/12/19 22:00  09/13/19 21:52





  Lipitor  PO   80 mg





  QHS EDLOR[facility]   Administration


 


Clopidogrel Bisulfate  75 mg  09/13/19 21:00  09/14/19 10:30





  Plavix  PO   75 mg





  DAILY DELORES   Administration


 


Donepezil HCl  5 mg  09/13/19 10:00  09/14/19 10:29





  Aricept  PO   5 mg





  DAILY [facility]OR[facility]   Administration


 


Hydralazine HCl  100 mg  09/14/19 12:59 





  Apresoline  PO  





  TID [facility]  


 


Hydromorphone HCl  0.5 mg  09/12/19 20:08  09/13/19 15:32





  Dilaudid  IV   0.5 mg





  Q3H PRN   Administration





  Pain , Severe (7-10)  


 


Insulin Human Lispro  0 unit  09/12/19 22:00  09/14/19 12:39





  Humalog  SUB-Q   Not Given





  ACHS [facility]  





  Protocol  


 


Miscellaneous Medication  145 mcg  09/13/19 10:00  09/13/19 10:37





  Linaclotide [Linzess]  PO   Not Given





  QDAY [facility]  


 


Miscellaneous Medication  100 mg  09/14/19 13:00 





  Metoprolol Jay/Hydrochlorothiaz [Metoprolol Er-Hctz 100-12.5 Mg]  PO  





  DAILY DELORES  


 


Montelukast Sodium  10 mg  09/13/19 18:00  09/13/19 19:09





  Singulair  PO   10 mg





  QPM [facility]   Administration


 


Ondansetron HCl  4 mg  09/12/19 20:07 





  Zofran  IV  





  Q8H PRN  





  Nausea And Vomiting  


 


Oxycodone/Acetaminophen  1 tab  09/12/19 20:08  09/14/19 12:43





  Percocet 5/325  PO   1 tab





  Q6H PRN   Administration





  Pain, Moderate (4-6)  


 


Oxycodone/Acetaminophen  1 tab  09/13/19 10:51 





  Percocet 5/325  PO  09/18/19 23:59 





  Q8H PRN  





  Pain, Moderate (4-6)  


 


Pantoprazole Sodium  40 mg  09/13/19 10:00  09/14/19 10:30





  Protonix  PO   40 mg





  QDAY [facility]   Administration


 


Ranolazine  1,000 mg  09/12/19 22:00  09/14/19 10:29





  Ranexa Er  PO   1,000 mg





  BID [facility]   Administration


 


Sertraline HCl  100 mg  09/12/19 22:00  09/13/19 21:51





  Zoloft  PO   100 mg





  HS DELOR[facility]   Administration


 


Sodium Chloride  10 ml  09/12/19 22:00  09/14/19 10:30





  Sodium Chloride Flush Syringe 10 Ml  IV   10 ml





  BID [facility]OR[facility]   Administration


 


Sodium Chloride  10 ml  09/12/19 20:09 





  Sodium Chloride Flush Syringe 10 Ml  IV  





  PRN PRN  





  LINE FLUSH  


 


Sucralfate  1 gm  09/12/19 22:00  09/14/19 12:42





  Carafate  PO   1 gm





  ACHS [facility]   Administration

## 2019-09-14 NOTE — CONSULTATION
History of Present Illness


Consult date: 09/14/19


Consult reason: other


History of present illness: 





Patient is a 68-year-old woman with a history of hypertension, anxiety, 

hyperlipidemia, diabetes, CAD, previous stroke with nose residual deficits.  

Yesterday at approximately 1:15 PM, the patient developed difficulty finding 

words.  Her daughter came and assessed her, and noted that her glucose levels 

were in the 90s.  She was given juice and a piece of candy, and states that her 

symptoms did not improve significantly.  The patient was then brought to the ER.

 She was evaluated by telemetry neurology, and was felt to be a TPA candidate.  

Patient was given TPA in the ER.  Afterwards, patient states that her symptoms 

completely resolved.


Cardiology has been consulted for long term heart rhythm monitoring to screen 

for atrial fibrillation as potential source of thrombo-embolic event which could

have caused patient's current neurological episode.


She underwent Echocardiogram on 9/12/19 which revealed EF 45-50, mild-mod MR, 

mild TR, mild pulm htn


EKG on presentation revealed SR and was otherwise unremarkable.





Past History


Past Medical History: diabetes (abdomen bowel sounds call Ambien and medical.  

We favor the past 8 to January surgery and surgical history family history is 

social), hypertension, hyperlipidemia, stroke, other (anxiety)


Past Surgical History: hysterectomy, total knee replacement ( bilateral), Other 

(lower back surgery)


Social history: lives with family, smoking (in the past--smoked for 30 years), 

full code


Family history: hypertension





Medications and Allergies


                                    Allergies











Allergy/AdvReac Type Severity Reaction Status Date / Time


 


aspirin Allergy  Hives Verified 09/12/19 15:13


 


codeine Allergy  Hives Verified 09/12/19 15:13


 


Penicillins Allergy  Hives Verified 09/12/19 15:13











                                Home Medications











 Medication  Instructions  Recorded  Confirmed  Last Taken  Type


 


ALPRAZolam [Xanax] 1 mg PO DAILY 02/10/18 09/12/19 09/11/19 History


 


Amlodipine Besylate [Norvasc] 5 mg PO DAILY 02/10/18 09/12/19 09/11/19 History


 


Canagliflozin [Invokana] 300 mg PO DAILY 02/10/18 09/12/19 09/11/19 History


 


Clopidogrel Bisulfate [Plavix] 75 mg PO DAILY 02/10/18 09/12/19 09/12/19 History


 


Cyclobenzaprine [Flexeril 10 MG 10 mg PO DAILY PRN 02/10/18 09/12/19 09/08/19 

History





TAB]     


 


Diclofenac Sodium 75 mg PO DAILY PRN 02/10/18 09/12/19 09/11/19 History


 


Donepezil [Aricept] 5 mg PO DAILY 02/10/18 09/12/19 09/11/19 History


 


Linaclotide [Linzess] 145 mcg PO QDAY 02/10/18 09/12/19 09/11/19 History


 


Metoprolol Jay/Hydrochlorothiaz 100 mg PO DAILY 02/10/18 09/12/19 09/11/19 

History





[Metoprolol ER-Hctz 100-12.5 mg]     


 


Ondansetron [Zofran Odt] 4 mg PO DAILY PRN 02/10/18 09/12/19 09/11/19 History


 


Pantoprazole [Protonix TAB] 40 mg PO QDAY 02/10/18 09/12/19 09/11/19 History


 


Ranolazine [Ranexa] 1,000 mg PO BID 02/10/18 09/12/19 09/11/19 History


 


Rosuvastatin Calcium [Crestor] 40 mg PO DAILY 02/10/18 09/12/19 09/11/19 History


 


Sertraline [Zoloft] 100 mg PO HS 02/10/18 09/12/19 09/11/19 History


 


Sucralfate [Carafate] 1 gm PO ACHS 02/10/18 09/12/19 09/11/19 History


 


metFORMIN [Glucophage] 500 mg PO QDAY 02/10/18 09/12/19 09/11/19 History


 


ALBUTEROL Inhaler (OR & NICU) 2 puff IH QID PRN 30 Days 02/12/18 09/12/19 09/11/19 Rx





[Proair] inhalation    


 


Montelukast [Singulair] 10 mg PO QPM #30 tablet 02/12/18 09/12/19 09/08/19 Rx


 


Oxycodone HCl/Acetaminophen 1 each PO Q12HR PRN #15 tablet 09/03/19 09/12/19 09/11/19 Rx





[Percocet 10/325 mg]     











Active Meds: 


Active Medications





Acetaminophen (Tylenol)  650 mg PO Q4H PRN


   PRN Reason: Pain MILD(1-3)/Fever >100.5/HA


Albuterol (Proventil)  2.5 mg IH Q4HRT PRN


   PRN Reason: Shortness Of Breath


Alprazolam (Xanax)  1 mg PO QHS [facility]


   Last Admin: 09/13/19 21:51 Dose:  1 mg


   Documented by: 


Amlodipine Besylate (Norvasc)  10 mg PO DAILY [facility]


Aspirin (Aspirin)  325 mg PO QDAY [facility]


   Last Admin: 09/13/19 10:36 Dose:  Not Given


   Documented by: 


Atorvastatin Calcium (Lipitor)  80 mg PO QHS [facility]


   Last Admin: 09/13/19 21:52 Dose:  80 mg


   Documented by: 


Clopidogrel Bisulfate (Plavix)  75 mg PO DAILY [facility]


   Last Admin: 09/13/19 21:51 Dose:  75 mg


   Documented by: 


Donepezil HCl (Aricept)  5 mg PO DAILY [facility]


   Last Admin: 09/13/19 10:36 Dose:  Not Given


   Documented by: 


Hydromorphone HCl (Dilaudid)  0.5 mg IV Q3H PRN


   PRN Reason: Pain , Severe (7-10)


   Last Admin: 09/13/19 15:32 Dose:  0.5 mg


   Documented by: 


Insulin Human Lispro (Humalog)  0 unit SUB-Q ACHS [facility]; Protocol


   Last Admin: 09/14/19 08:50 Dose:  Not Given


   Documented by: 


Metoprolol Succinate (Toprol Xl)  100 mg PO QDAY [facility]


   Last Admin: 09/13/19 10:39 Dose:  100 mg


   Documented by: 


Miscellaneous Medication (Linaclotide [Linzess])  145 mcg PO QDAY [facility]


   Last Admin: 09/13/19 10:37 Dose:  Not Given


   Documented by: 


Montelukast Sodium (Singulair)  10 mg PO QPM [facility]


   Last Admin: 09/13/19 19:09 Dose:  10 mg


   Documented by: 


Ondansetron HCl (Zofran)  4 mg IV Q8H PRN


   PRN Reason: Nausea And Vomiting


Oxycodone/Acetaminophen (Percocet 5/325)  1 tab PO Q6H PRN


   PRN Reason: Pain, Moderate (4-6)


   Last Admin: 09/14/19 02:13 Dose:  1 tab


   Documented by: 


Oxycodone/Acetaminophen (Percocet 5/325)  1 tab PO Q8H PRN


   PRN Reason: Pain, Moderate (4-6)


   Stop: 09/18/19 23:59


Pantoprazole Sodium (Protonix)  40 mg PO QDAY [facility]


   Last Admin: 09/13/19 10:38 Dose:  40 mg


   Documented by: 


Ranolazine (Ranexa Er)  1,000 mg PO BID [facility]


   Last Admin: 09/13/19 21:51 Dose:  1,000 mg


   Documented by: 


Sertraline HCl (Zoloft)  100 mg PO HS [facility]


   Last Admin: 09/13/19 21:51 Dose:  100 mg


   Documented by: 


Sodium Chloride (Sodium Chloride Flush Syringe 10 Ml)  10 ml IV BID [facility]


   Last Admin: 09/13/19 21:55 Dose:  10 ml


   Documented by: 


Sodium Chloride (Sodium Chloride Flush Syringe 10 Ml)  10 ml IV PRN PRN


   PRN Reason: LINE FLUSH


Sucralfate (Carafate)  1 gm PO ACHS [facility]


   Last Admin: 09/14/19 08:48 Dose:  1 gm


   Documented by: 











Review of Systems


All systems: negative (per hpi)





Physical Examination


                                   Vital Signs











Pulse Resp


 


 76   15 


 


 09/12/19 14:10  09/12/19 14:10











General appearance: no acute distress


HEENT: Positive: PERRL, EOMI


Cardiac: Positive: Reg Rate and Rhythm.  Negative: Systolic Murmur


Lungs: Positive: clear to auscultation, Normal Breath Sounds


Abdomen: Positive: Soft, Active Bowel Sounds


Extremities: Absent: edema





Results





                                 09/12/19 15:05





                                 09/14/19 07:17


                                 Cardiac Enzymes











  09/13/19 Range/Units





  09:37 


 


AST  22  (5-40)  units/L








                                     Lipids











  09/13/19 Range/Units





  09:37 


 


Triglycerides  58  (2-149)  mg/dL


 


Cholesterol  139  ()  mg/dL


 


HDL Cholesterol  56  (40-59)  mg/dL


 


Cholesterol/HDL Ratio  2.48  %








                          Comprehensive Metabolic Panel











  09/13/19 09/14/19 Range/Units





  09:37 07:17 


 


Sodium  139  140  (137-145)  mmol/L


 


Potassium  4.5  4.4  (3.6-5.0)  mmol/L


 


Chloride  103.9  104.3  ()  mmol/L


 


Carbon Dioxide  25  23  (22-30)  mmol/L


 


BUN  24 H  18 H  (7-17)  mg/dL


 


Creatinine  1.4 H  1.1  (0.7-1.2)  mg/dL


 


Glucose  90  89  ()  mg/dL


 


Calcium  9.7  10.0  (8.4-10.2)  mg/dL


 


AST  22   (5-40)  units/L


 


ALT  13   (7-56)  units/L


 


Alkaline Phosphatase  103   ()  units/L


 


Total Protein  6.8   (6.3-8.2)  g/dL


 


Albumin  4.0   (3.9-5)  g/dL














Assessment and Plan





1.  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


2.  Htn


3.  Mild LV dysfunction.





Recommend:


1.  Continue current therapy and titrate anti-hypertensive drugs as needed


2.  Outpatient 30 day event monitor to screen for atrial fibrillation as 

potential source of thrombo-embolic event which could have caused patient's 

current neurological episode.  Subsequent ILR can be considered if event monitor

is negative and longer term heart rhythm monitoring is felt to be necessary.  

Upon discharge, patient can come to our [place] office and be hooked up to 30 

day event monitor - she can f/u with me (Dr. Bill Gonzalez) after that.

## 2019-09-15 NOTE — DISCHARGE SUMMARY
Providers





- Providers


Date of Admission: 


09/12/19 19:55





Date of discharge: 09/15/19


Attending physician: 


FROYLAN ASHLEY





                                        





09/12/19 20:08


Consult to Physician [CONS] Routine 


   Comment: 


   Consulting Provider: THOMAS GONZALEZ


   Physician Instructions: 


   Reason For Exam: acute cerebrovascular accident





09/12/19 20:09


Occupational Therapy Evaluate and Treat [CONS] Routine 


   Comment: 


   Reason For Exam: Neuro deficits


Physical Therapy Evaluation and Treat [CONS] Routine 


   Comment: 


   Reason For Exam: Neuro deficits





09/14/19 07:42


Consult to Physician [CONS] Routine 


   Comment: erica  heart per Dr MAL Day


   Consulting Provider: NICKIE AWAN


   Physician Instructions: 


   Reason For Exam: s/p stroke,  may need event monitor











Primary care physician: 


PCP





Hospitalization


Reason for admission: TIA, HTN, T2DM, 


Condition: Stable


Pertinent studies: 





CT of the brain was normal.  CTA showed no significant stenosis, 


MRI of the brain unremarkable.  


Procedures: 





none


Hospital course: 





68-year-old -American female with history of hypertension, diabetes and 

CVA in the past without any residual deficits comes in for sudden onset of 

slurred speech since 1:30 PM.  No upper extremity or lower extremity weakness.  

Able to walk.  No loss of consciousness.  No chest pain or syncope.  Patient had

 a fall about 10 days ago and has a periorbital hematoma with supraorbital large

 hematoma on the left periorbital area about 2 cm x 1 cm..  No recent travel.  

Patient takes Plavix from previous cerebrovascular accident.  Code stroke was 

initiated and as per telemetry neurology patient was given TPA even though there

 was no weakness in  any of the 4 extremities. CT head with CTA of Head and neck

 as well as MRI of the head ere unremarkable. Patient ambulates on the floor 

without any neurological deficit.








Disposition: DC-01 TO HOME OR SELFCARE


Time spent for discharge: 40 mins





- Discharge Diagnoses


(1) TIA (transient ischemic attack)


Status: Acute   





(2) Acute kidney injury (KATY) with acute tubular necrosis (ATN)


Status: Acute   





(3) HTN (hypertension)


Status: Acute   





(4) Hyponatremia


Status: Acute   





(5) Type 2 diabetes mellitus


Status: Chronic   


Qualifiers: 


   Diabetes mellitus long term insulin use: unspecified long term insulin use 

status 





Core Measure Documentation





- Palliative Care


Palliative Care/ Comfort Measures: Not Applicable





- Core Measures


Any of the following diagnoses?: none





Exam





- Physical Exam


Narrative exam: 





Constitutional: Well-nourished well-developed.  In no distress


Head: Normocephalic atraumatic


Eyes: Pupils are equal round and reactive to light


Nose: No enlarged turbinates, no septal deviation.


Mouth: Moist mucous membranes.


Neck: Supple no thyromegaly.  No bruit.  No JVD


Heart: Regular rate and rhythm, S1-S2 normal.  No rubs murmurs or gallop


Lungs: Clear to auscultation bilaterally. no rales or rhonchi


Abdomen: Soft, nontender. Bowel sound are present. 


Extremities: No edema, no cyanosis, no clubbing.


Neuro: Alert oriented Oriented x3. No focal sensory or motor deficit.


Skin: No rashes or hyperpigmented spots


Musculoskeletal system: No joint pain or swelling


Hematological: No petechia or subcutanous hemorrhages.


Immunological: No multiple septic spots on the skin


Lymphatic: No generalized lymphadenopathy


Psychiatry: Euthymic. Calm.














- Constitutional


Vitals: 


                                        











Temp Pulse Resp BP Pulse Ox


 


 98.3 F   69   18   136/91   99 


 


 09/15/19 12:03  09/15/19 12:03  09/15/19 12:03  09/15/19 12:03  09/15/19 12:03














Plan


Activity: fall precautions


Weight Bearing Status: Non-Weight Bearing


Diet: regular


Follow up with: 


PRIMARY CARE,MD [Referring] - 3-5 Days


Prescriptions: 


Glimepiride [Amaryl] 2 mg PO QAM #30 tablet


hydrALAZINE [Apresoline TAB] 100 mg PO TID #90 tab


Donepezil [Aricept] 5 mg PO DAILY #30 tablet


hydroCHLOROthiazide [HCTZ] 12.5 mg PO QDAY #30 capsule


Linaclotide [Linzess] 145 mcg PO QDAY #30 capsule


AtorvaSTATin [Lipitor] 80 mg PO QHS #30 tablet


Metoprolol Xl [Metoprolol SUCCINATE ER TAB] 100 mg PO QDAY #30 tablet


Amlodipine Besylate [Norvasc] 5 mg PO DAILY #30 tablet


Oxycodone HCl/Acetaminophen [Percocet 10/325 mg] 1 each PO Q12HR PRN #15 tablet


 PRN Reason: Pain


Clopidogrel [Plavix] 75 mg PO DAILY #30 tablet


Pantoprazole [Protonix TAB] 40 mg PO QDAY #30 tablet


ALBUTEROL NEB's [Proventil 0.083% NEBS] 2.5 mg IH Q4HRT PRN #100 nebu


 PRN Reason: Shortness Of Breath


Ranolazine ER [Ranexa ER] 1,000 mg PO BID #60 tablet


Montelukast [Singulair] 10 mg PO QPM #30 tablet


Sertraline [Zoloft] 100 mg PO HS #30 tablet

## 2019-09-15 NOTE — PROGRESS NOTE
Assessment and Plan





1.  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


2.  Htn


3.  Mild LV dysfunction.





Recommend:


1.  Continue current therapy and titrate anti-hypertensive drugs as needed


2.  Outpatient 30 day event monitor to screen for atrial fibrillation as 

potential source of thrombo-embolic event which could have caused patient's 

current neurological episode.  Subsequent ILR can be considered if event monitor

is negative and longer term heart rhythm monitoring is felt to be necessary.  

Upon discharge, patient can come to our [place] office and be hooked up to 30 

day event monitor - she can f/u with me (Dr. Bill Gonzalez) after that.





Subjective


Date of service: 09/15/19


Interval history: 





No cardiac complaints





Objective


                                   Vital Signs











  Temp Pulse Resp BP Pulse Ox


 


 09/15/19 09:47   76   122/72 


 


 09/15/19 09:45   76   141/102 


 


 09/15/19 08:20  98.0 F  68  18  141/102  98


 


 09/15/19 04:23  98.6 F  66  20  136/80  96


 


 09/15/19 02:35   68   


 


 09/14/19 23:58  98.2 F  60  20  135/74  97


 


 09/14/19 20:02  98.2 F  58 L  20  154/86  99


 


 09/14/19 16:03  98.2 F  60  18  133/69  96


 


 09/14/19 12:21  98.0 F  62  20  178/92  100














- Physical Examination


General: Other (ecchymosis and hematoma in meera-orbital area)


HEENT: Positive: PERRL, EOMI


Cardiac: Positive: Reg Rate and Rhythm


Lungs: Positive: clear to auscultation


Abdomen: Positive: Soft, Active Bowel Sounds


Extremities: Absent: edema





- Imaging and Cardiology


EKG: report reviewed (normal sinus rhythm heart rate of 75/m no acute ST-T wave 

changes)

## 2019-12-03 ENCOUNTER — HOSPITAL ENCOUNTER (OUTPATIENT)
Dept: HOSPITAL 5 - VAS | Age: 69
Discharge: HOME | End: 2019-12-03
Attending: PODIATRIST
Payer: MEDICARE

## 2019-12-03 DIAGNOSIS — M79.661: ICD-10-CM

## 2019-12-03 DIAGNOSIS — E11.51: ICD-10-CM

## 2019-12-03 DIAGNOSIS — M79.89: ICD-10-CM

## 2019-12-03 DIAGNOSIS — M79.662: Primary | ICD-10-CM

## 2019-12-03 PROCEDURE — 93970 EXTREMITY STUDY: CPT

## 2019-12-03 NOTE — VASCULAR LAB REPORT
DUPLEX DOPPLER LOWER EXTREMITY VEINS, BILATERAL



INDICATION:

Bilateral lower extremity pain.



TECHNIQUE:

Duplex doppler imaging was performed through the veins of both lower extremities using venous jose luis
mone and other maneuvers.



COMPARISON: 

No relevant prior imaging study available.



FINDINGS:

Right Common femoral vein: Negative.

Right Superficial femoral vein: Negative.

Right Popliteal vein: Negative.

Right Calf veins: Negative.



Left Common femoral vein: Negative.

Left Superficial femoral vein: Negative.

Left Popliteal vein: Negative.

Left Calf veins: Negative.



Additional findings: None..



IMPRESSION:

1. No sonographic evidence for DVT in either lower extremity.



Signer Name: Acosta Otero MD 

Signed: 12/3/2019 2:25 PM

 Workstation Name: [email or web address]